# Patient Record
Sex: FEMALE | Race: WHITE | NOT HISPANIC OR LATINO | Employment: FULL TIME | URBAN - METROPOLITAN AREA
[De-identification: names, ages, dates, MRNs, and addresses within clinical notes are randomized per-mention and may not be internally consistent; named-entity substitution may affect disease eponyms.]

---

## 2017-02-10 ENCOUNTER — PATIENT MESSAGE (OUTPATIENT)
Dept: RHEUMATOLOGY | Facility: CLINIC | Age: 47
End: 2017-02-10

## 2017-02-10 ENCOUNTER — PATIENT MESSAGE (OUTPATIENT)
Dept: NEUROLOGY | Facility: CLINIC | Age: 47
End: 2017-02-10

## 2017-02-10 DIAGNOSIS — G40.319 GENERALIZED CONVULSIVE EPILEPSY WITH INTRACTABLE EPILEPSY: Primary | ICD-10-CM

## 2017-02-15 ENCOUNTER — TELEPHONE (OUTPATIENT)
Dept: RHEUMATOLOGY | Facility: CLINIC | Age: 47
End: 2017-02-15

## 2017-02-15 DIAGNOSIS — L40.50 PSORIATIC ARTHRITIS: ICD-10-CM

## 2017-02-15 RX ORDER — CELECOXIB 200 MG/1
200 CAPSULE ORAL DAILY PRN
Qty: 90 CAPSULE | Refills: 0 | Status: SHIPPED | OUTPATIENT
Start: 2017-02-15 | End: 2017-03-29 | Stop reason: SDUPTHER

## 2017-03-02 ENCOUNTER — PATIENT MESSAGE (OUTPATIENT)
Dept: NEUROLOGY | Facility: CLINIC | Age: 47
End: 2017-03-02

## 2017-03-03 ENCOUNTER — TELEPHONE (OUTPATIENT)
Dept: NEUROLOGY | Facility: CLINIC | Age: 47
End: 2017-03-03

## 2017-03-03 NOTE — TELEPHONE ENCOUNTER
Called in refill of Patient's Lamictal to [facility] in [place] per Patient request.She requests a 90 day supply.

## 2017-03-20 ENCOUNTER — LAB VISIT (OUTPATIENT)
Dept: LAB | Facility: HOSPITAL | Age: 47
End: 2017-03-20
Attending: INTERNAL MEDICINE
Payer: COMMERCIAL

## 2017-03-20 DIAGNOSIS — L40.9 PSORIASIS: ICD-10-CM

## 2017-03-20 DIAGNOSIS — L40.50 PSORIATIC ARTHRITIS: Chronic | ICD-10-CM

## 2017-03-20 DIAGNOSIS — G40.319 GENERALIZED CONVULSIVE EPILEPSY WITH INTRACTABLE EPILEPSY: ICD-10-CM

## 2017-03-20 DIAGNOSIS — Z79.899 ENCOUNTER FOR MONITORING LEFLUNOMIDE THERAPY: ICD-10-CM

## 2017-03-20 DIAGNOSIS — Z51.81 ENCOUNTER FOR MONITORING LEFLUNOMIDE THERAPY: ICD-10-CM

## 2017-03-20 DIAGNOSIS — Z91.89 AT RISK FOR OSTEOPOROSIS/OSTEOPENIA: ICD-10-CM

## 2017-03-20 LAB
25(OH)D3+25(OH)D2 SERPL-MCNC: 32 NG/ML
ALBUMIN SERPL BCP-MCNC: 4 G/DL
ALP SERPL-CCNC: 110 U/L
ALT SERPL W/O P-5'-P-CCNC: 29 U/L
ANION GAP SERPL CALC-SCNC: 9 MMOL/L
AST SERPL-CCNC: 21 U/L
BASOPHILS # BLD AUTO: 0.03 K/UL
BASOPHILS NFR BLD: 0.5 %
BILIRUB SERPL-MCNC: 0.5 MG/DL
BUN SERPL-MCNC: 17 MG/DL
CALCIUM SERPL-MCNC: 10.2 MG/DL
CHLORIDE SERPL-SCNC: 103 MMOL/L
CO2 SERPL-SCNC: 30 MMOL/L
CREAT SERPL-MCNC: 0.8 MG/DL
CRP SERPL-MCNC: 4.4 MG/L
DIFFERENTIAL METHOD: NORMAL
EOSINOPHIL # BLD AUTO: 0.3 K/UL
EOSINOPHIL NFR BLD: 5.9 %
ERYTHROCYTE [DISTWIDTH] IN BLOOD BY AUTOMATED COUNT: 13.9 %
ERYTHROCYTE [SEDIMENTATION RATE] IN BLOOD BY WESTERGREN METHOD: 7 MM/HR
EST. GFR  (AFRICAN AMERICAN): >60 ML/MIN/1.73 M^2
EST. GFR  (NON AFRICAN AMERICAN): >60 ML/MIN/1.73 M^2
GLUCOSE SERPL-MCNC: 102 MG/DL
HCT VFR BLD AUTO: 44.2 %
HGB BLD-MCNC: 14.2 G/DL
LYMPHOCYTES # BLD AUTO: 1.8 K/UL
LYMPHOCYTES NFR BLD: 32 %
MCH RBC QN AUTO: 28.7 PG
MCHC RBC AUTO-ENTMCNC: 32.1 %
MCV RBC AUTO: 89 FL
MONOCYTES # BLD AUTO: 0.4 K/UL
MONOCYTES NFR BLD: 7.3 %
NEUTROPHILS # BLD AUTO: 3 K/UL
NEUTROPHILS NFR BLD: 54.1 %
PLATELET # BLD AUTO: 286 K/UL
PMV BLD AUTO: 10.8 FL
POTASSIUM SERPL-SCNC: 4 MMOL/L
PROT SERPL-MCNC: 7.5 G/DL
RBC # BLD AUTO: 4.95 M/UL
SODIUM SERPL-SCNC: 142 MMOL/L
WBC # BLD AUTO: 5.59 K/UL

## 2017-03-20 PROCEDURE — 80053 COMPREHEN METABOLIC PANEL: CPT

## 2017-03-20 PROCEDURE — 85651 RBC SED RATE NONAUTOMATED: CPT | Mod: PO

## 2017-03-20 PROCEDURE — 82306 VITAMIN D 25 HYDROXY: CPT

## 2017-03-20 PROCEDURE — 80175 DRUG SCREEN QUAN LAMOTRIGINE: CPT

## 2017-03-20 PROCEDURE — 85025 COMPLETE CBC W/AUTO DIFF WBC: CPT

## 2017-03-20 PROCEDURE — 86140 C-REACTIVE PROTEIN: CPT

## 2017-03-23 LAB — LAMOTRIGINE SERPL-MCNC: 4.5 UG/ML (ref 2–15)

## 2017-03-29 ENCOUNTER — OFFICE VISIT (OUTPATIENT)
Dept: RHEUMATOLOGY | Facility: CLINIC | Age: 47
End: 2017-03-29
Payer: COMMERCIAL

## 2017-03-29 VITALS
BODY MASS INDEX: 30.88 KG/M2 | HEART RATE: 87 BPM | DIASTOLIC BLOOD PRESSURE: 103 MMHG | WEIGHT: 215.69 LBS | SYSTOLIC BLOOD PRESSURE: 156 MMHG | HEIGHT: 70 IN

## 2017-03-29 DIAGNOSIS — Z51.81 ENCOUNTER FOR MONITORING LEFLUNOMIDE THERAPY: ICD-10-CM

## 2017-03-29 DIAGNOSIS — L40.9 PSORIASIS: ICD-10-CM

## 2017-03-29 DIAGNOSIS — E55.9 VITAMIN D DEFICIENCY: ICD-10-CM

## 2017-03-29 DIAGNOSIS — Z79.899 ENCOUNTER FOR MONITORING LEFLUNOMIDE THERAPY: ICD-10-CM

## 2017-03-29 DIAGNOSIS — L60.1 ONYCHOLYSIS OF TOENAIL: ICD-10-CM

## 2017-03-29 DIAGNOSIS — L40.50 PSORIATIC ARTHRITIS: Primary | Chronic | ICD-10-CM

## 2017-03-29 DIAGNOSIS — I10 ESSENTIAL HYPERTENSION: ICD-10-CM

## 2017-03-29 PROCEDURE — 1160F RVW MEDS BY RX/DR IN RCRD: CPT | Mod: S$GLB,,, | Performed by: INTERNAL MEDICINE

## 2017-03-29 PROCEDURE — 99214 OFFICE O/P EST MOD 30 MIN: CPT | Mod: S$GLB,,, | Performed by: INTERNAL MEDICINE

## 2017-03-29 PROCEDURE — 99999 PR PBB SHADOW E&M-EST. PATIENT-LVL III: CPT | Mod: PBBFAC,,, | Performed by: INTERNAL MEDICINE

## 2017-03-29 PROCEDURE — 3080F DIAST BP >= 90 MM HG: CPT | Mod: S$GLB,,, | Performed by: INTERNAL MEDICINE

## 2017-03-29 PROCEDURE — 3077F SYST BP >= 140 MM HG: CPT | Mod: S$GLB,,, | Performed by: INTERNAL MEDICINE

## 2017-03-29 RX ORDER — LEFLUNOMIDE 20 MG/1
20 TABLET ORAL DAILY
Qty: 90 TABLET | Refills: 0 | Status: SHIPPED | OUTPATIENT
Start: 2017-03-29 | End: 2017-06-26 | Stop reason: SDUPTHER

## 2017-03-29 RX ORDER — ERGOCALCIFEROL 1.25 MG/1
50000 CAPSULE ORAL
Qty: 12 CAPSULE | Refills: 3 | Status: SHIPPED | OUTPATIENT
Start: 2017-03-29 | End: 2018-03-15 | Stop reason: SDUPTHER

## 2017-03-29 RX ORDER — CELECOXIB 200 MG/1
200 CAPSULE ORAL DAILY PRN
Qty: 90 CAPSULE | Refills: 1 | Status: SHIPPED | OUTPATIENT
Start: 2017-03-29 | End: 2017-09-22 | Stop reason: SDUPTHER

## 2017-03-29 ASSESSMENT — ROUTINE ASSESSMENT OF PATIENT INDEX DATA (RAPID3)
PATIENT GLOBAL ASSESSMENT SCORE: 0
AM STIFFNESS SCORE: 0, NO
TOTAL RAPID3 SCORE: 2.39
FATIGUE SCORE: 7
MDHAQ FUNCTION SCORE: .5
PSYCHOLOGICAL DISTRESS SCORE: 0
PAIN SCORE: 5.5

## 2017-03-29 ASSESSMENT — DISEASE ACTIVITY SCORE (DAS28)
GLOBAL_HEALTH_SCORE: 0
CRP_MG_PER_LITER: 4.4
ESR_MM_PER_HR: 7

## 2017-03-29 NOTE — MR AVS SNAPSHOT
[facility]  [address]  [facility] [address]  Phone: [phone]  Fax: [phone]                  Mable Arguello   3/29/2017 2:00 PM   Office Visit    Description:  Female : 1970   Provider:  Waqar Noel MD   Department:  [facility]           Diagnoses this Visit        Comments    Psoriatic arthritis    -  Primary     Encounter for monitoring leflunomide therapy         Psoriasis         Vitamin D deficiency         Onycholysis of toenail         Essential hypertension                To Do List           Goals (5 Years of Data)     None      Follow-Up and Disposition     Return in about 3 months (around 2017).       These Medications        Disp Refills Start End    leflunomide (ARAVA) 20 MG Tab 90 tablet 0 3/29/2017     Take 1 tablet (20 mg total) by mouth once daily. - Oral    Pharmacy: [facility]/pharmacy #[id number] [facility] [address] Ph #: [phone]       celecoxib (CELEBREX) 200 MG capsule 90 capsule 1 3/29/2017     Take 1 capsule (200 mg total) by mouth daily as needed. - Oral    Pharmacy: [facility]/pharmacy #[address] [place] [address] [address] Ph #: [phone]       ergocalciferol (VITAMIN D2) 50,000 unit Cap 12 capsule 3 3/29/2017     Take 1 capsule (50,000 Units total) by mouth every 7 days. - Oral    Pharmacy: [facility]/pharmacy #[address] [place] [address] [address] Ph #: [phone]         [facility] On Call     [facility] On Call Nurse Care Line -  Assistance  Registered nurses in the [facility] provide clinical advisement, health education, appointment booking, and other advisory services.  Call for this free service at [phone].             Medications           Message regarding Medications     Verify the changes and/or additions to your medication regime listed below are the same as discussed with your clinician today.  If any of these changes or additions are incorrect, please notify your  "healthcare provider.        STOP taking these medications     urea (X-VIATE) 40 % cream Apply 1 applicator topically 2 (two) times daily as needed.             Verify that the below list of medications is an accurate representation of the medications you are currently taking.  If none reported, the list may be blank. If incorrect, please contact your healthcare provider. Carry this list with you in case of emergency.           Current Medications     albuterol (PROVENTIL HFA) 90 mcg/actuation inhaler INHALE 2 PUFFS BY MOUTH FOUR TIMES DAILY AS NEEDED FOR WHEEZING    atorvastatin (LIPITOR) 10 MG tablet Take 1 tablet (10 mg total) by mouth once daily.    biotin 10,000 mcg Cap Take by mouth.    celecoxib (CELEBREX) 200 MG capsule Take 1 capsule (200 mg total) by mouth daily as needed.    clotrimazole-betamethasone 1-0.05% (LOTRISONE) cream Apply topically 2 (two) times daily.    ergocalciferol (VITAMIN D2) 50,000 unit Cap Take 1 capsule (50,000 Units total) by mouth every 7 days.    leflunomide (ARAVA) 20 MG Tab Take 1 tablet (20 mg total) by mouth once daily.    losartan-hydrochlorothiazide 100-12.5 mg (HYZAAR) 100-12.5 mg Tab Take 1 tablet by mouth once daily.    thiamine (VITAMIN B-1) 50 MG tablet Take 50 mg by mouth once daily.    lamotrigine (LAMICTAL) 200 MG tablet Take 2 tablets (400 mg total) by mouth once daily.           Clinical Reference Information           Your Vitals Were     BP Pulse Height Weight Last Period BMI    156/103 87 5' 9.6" (1.768 m) 97.8 kg (215 lb 11.2 oz) 07/08/2008 31.31 kg/m2      Blood Pressure          Most Recent Value    BP  (!)  156/103      Allergies as of 3/29/2017     Topamax [Topiramate]      Immunizations Administered on Date of Encounter - 3/29/2017     None      Orders Placed During Today's Visit     Future Labs/Procedures Expected by Expires    XR Arthritis Survey  3/29/2017 3/29/2018      Language Assistance Services     ATTENTION: Language assistance services are " available, free of charge. Please call [phone].      ATENCIÓN: Si habla vishalañol, tiene a jimenez disposición servicios gratuitos de asistencia lingüística. Llame al [phone].     CHÚ Ý: N?u b?n nói Ti?ng Vi?t, có các d?ch v? h? tr? ngôn ng? mi?n phí dành cho b?n. G?i s? [phone].         [facility] complies with applicable Federal civil rights laws and does not discriminate on the basis of race, color, national origin, age, disability, or sex.

## 2017-03-29 NOTE — PROGRESS NOTES
Subjective:       Patient ID: Mable Arguello is a 46 y.o. female.    Chief Complaint: Psoriatic arthritis    HPI Has cough, SOB starting Monday with cough, resumed inhaler which helped, no audible wheezing. Has appt with Dr. Montoya Friday. Also yesterday, pain left plantar heel, left great toe, left hip, knee and shoulder and wrist, now resolved.  No psoriasis skin lesions, still  Nail changes, unchanged. Also had some diffuse backache, now resolved. Has been taking Celebrex 200mg daily x 3 wks.  Review of Systems   Constitutional: Positive for fatigue. Negative for appetite change, fever and unexpected weight change.   HENT: Negative for mouth sores.         Mouth ulcers   Eyes: Negative for visual disturbance.   Respiratory: Positive for shortness of breath. Negative for cough and wheezing.    Cardiovascular: Negative for chest pain and palpitations.   Gastrointestinal: Negative for abdominal pain, anal bleeding, blood in stool, constipation, diarrhea, nausea and vomiting.   Genitourinary: Negative for dysuria, frequency and urgency.   Musculoskeletal: Negative for arthralgias, back pain, gait problem, joint swelling, myalgias, neck pain and neck stiffness.   Skin: Negative for rash.   Neurological: Negative for weakness, numbness and headaches.   Hematological: Negative for adenopathy. Does not bruise/bleed easily.   Psychiatric/Behavioral: Negative for sleep disturbance. The patient is not nervous/anxious.          Objective:   LMP 07/08/2008     Physical Exam   Constitutional: She is oriented to person, place, and time and well-developed, well-nourished, and in no distress.   HENT:   Head: Normocephalic and atraumatic.   Mouth/Throat: Oropharynx is clear and moist.   Eyes: Conjunctivae and EOM are normal.   Neck: Normal range of motion. Neck supple. No thyromegaly present.   Cardiovascular: Normal rate, regular rhythm, normal heart sounds and intact distal pulses.  Exam reveals no gallop and no friction  rub.    No murmur heard.  Pulmonary/Chest: Breath sounds normal. She has no wheezes. She has no rales. She exhibits no tenderness.   Abdominal: Soft. She exhibits no distension and no mass. There is no tenderness.       Right Side Rheumatological Exam     Examination finds the shoulder, elbow, wrist, knee, 1st PIP, 1st MCP, 2nd PIP, 2nd MCP, 3rd PIP, 3rd MCP, 4th PIP, 4th MCP, 5th PIP and 5th MCP normal.    Left Side Rheumatological Exam     Examination finds the shoulder, elbow, wrist, knee, 1st PIP, 1st MCP, 2nd PIP, 2nd MCP, 3rd PIP, 3rd MCP, 4th PIP, 4th MCP, 5th PIP and 5th MCP normal.      Lymphadenopathy:     She has no cervical adenopathy.   Neurological: She is alert and oriented to person, place, and time. She displays normal reflexes. Gait normal.   Nl motor strength UE and LE bilateral   Musculoskeletal: She exhibits no edema.       Results for ELIER SAVAGE (MRN [id number]) as of 3/29/2017 13:59   Ref. Range 3/20/2017 07:43   WBC Latest Ref Range: 3.90 - 12.70 K/uL 5.59   RBC Latest Ref Range: 4.00 - 5.40 M/uL 4.95   Hemoglobin Latest Ref Range: 12.0 - 16.0 g/dL 14.2   Hematocrit Latest Ref Range: 37.0 - 48.5 % 44.2   MCV Latest Ref Range: 82 - 98 fL 89   MCH Latest Ref Range: 27.0 - 31.0 pg 28.7   MCHC Latest Ref Range: 32.0 - 36.0 % 32.1   RDW Latest Ref Range: 11.5 - 14.5 % 13.9   Platelets Latest Ref Range: 150 - 350 K/uL 286   MPV Latest Ref Range: 9.2 - 12.9 fL 10.8   Gran% Latest Ref Range: 38.0 - 73.0 % 54.1   Gran # Latest Ref Range: 1.8 - 7.7 K/uL 3.0   Lymph% Latest Ref Range: 18.0 - 48.0 % 32.0   Lymph # Latest Ref Range: 1.0 - 4.8 K/uL 1.8   Mono% Latest Ref Range: 4.0 - 15.0 % 7.3   Mono # Latest Ref Range: 0.3 - 1.0 K/uL 0.4   Eosinophil% Latest Ref Range: 0.0 - 8.0 % 5.9   Eos # Latest Ref Range: 0.0 - 0.5 K/uL 0.3   Basophil% Latest Ref Range: 0.0 - 1.9 % 0.5   Baso # Latest Ref Range: 0.00 - 0.20 K/uL 0.03   Sed Rate Latest Ref Range: 0 - 20 mm/Hr 7   Sodium Latest Ref Range: 136 -  145 mmol/L 142   Potassium Latest Ref Range: 3.5 - 5.1 mmol/L 4.0   Chloride Latest Ref Range: 95 - 110 mmol/L 103   CO2 Latest Ref Range: 23 - 29 mmol/L 30 (H)   Anion Gap Latest Ref Range: 8 - 16 mmol/L 9   BUN, Bld Latest Ref Range: 6 - 20 mg/dL 17   Creatinine Latest Ref Range: 0.5 - 1.4 mg/dL 0.8   eGFR if non African American Latest Ref Range: >60 mL/min/1.73 m^2 >60.0   eGFR if African American Latest Ref Range: >60 mL/min/1.73 m^2 >60.0   Glucose Latest Ref Range: 70 - 110 mg/dL 102   Calcium Latest Ref Range: 8.7 - 10.5 mg/dL 10.2   Alkaline Phosphatase Latest Ref Range: 55 - 135 U/L 110   Total Protein Latest Ref Range: 6.0 - 8.4 g/dL 7.5   Albumin Latest Ref Range: 3.5 - 5.2 g/dL 4.0   Total Bilirubin Latest Ref Range: 0.1 - 1.0 mg/dL 0.5   AST Latest Ref Range: 10 - 40 U/L 21   ALT Latest Ref Range: 10 - 44 U/L 29   CRP Latest Ref Range: 0.0 - 8.2 mg/L 4.4   Vit D, 25-Hydroxy Latest Ref Range: 30 - 96 ng/mL 32   Lamotrigine Lvl Latest Ref Range: 2.0 - 15.0 ug/mL 4.5   Differential Method Unknown Automated        : 1970 ORDERING PHYSICIAN: NY Mauro LOCATION: [facility] [place]    HISTORY: 41 y/o female with a hx of a fractured left foot at 27 y/o and 33 y/o. She had a hysterectomy at 36 y/o and a Oophorectomy at 42 y/o. She exercise 3 times a week and does not smoke.    TECHNIQUE: Bone Mineral Density performed using [employer] [place] (S/N [id number]) reveals good positioning of lumbar spine and hip.    BONE MINERAL DENSITY RESULTS:  Lumbar Spine: Lumbar bone mineral density L1-L4 is 1.130g/cm2, which is a t-score of 0.8. The z-score is 1.2.    Total Hip: The total hip bone mineral density is 1.042g/cm2.  The t-score is 0.8, and the z-score is 1.1.  Femoral neck BMD is 0.906g/cm2 and the t-score is 0.5.    COMPARISONS:  Date Location BMD T-score  08 L-spine 1.271 0.2  Total Hip 0.991 0.4   Impression     Normal spine and hip BMD.     Recommendations:  1) Adequate calcium and Vitamin D therapy  2)  Appropriate exercise  3) No need to repeat BMD in near future unless clinical change      EXPLANATION OF RESULTS:  The t-score compares this results to the bone density of a 25 year old of the same gender. The z-score compares this result to the average bone density to people of the same age and gender. The amounts indicate the number of standard deviations above or   below the mean.  * Osteoporosis is generally defined as having a t-score between less      The x-rays are normal except for old healed fracture left 5th metatarsal, and osteoarthritis of great toes. No psoriatic arthritis damage. RJQ   External Result Report   External Result Report   Narrative   Technique: Arthritis.    Findings: Flexion view the cervical spine shows the odontoid and anterior mass of C1 have a normal relationship.  The cervical spine is intact.  AP view of the hand shows no bony erosions or destruction.  AP view of the feet shows an old healed fracture of the left fifth metatarsal otherwise feet are normal.  Standing view of the knee shows the joint space to be maintained.   Impression    No significant abnormality.      Electronically signed by: Aj Camara MD  Date: 12/09/15  Time: 11:24     Encounter   View Encounter        Results for ELIER SAVAGE (MRN [id number]) as of 3/29/2017 13:59   Ref. Range 11/11/2016 07:50   Cholesterol Latest Ref Range: 120 - 199 mg/dL 196   HDL Latest Ref Range: 40 - 75 mg/dL 75   LDL Cholesterol Latest Ref Range: 63.0 - 159.0 mg/dL 102.4   Total Cholesterol/HDL Ratio Latest Ref Range: 2.0 - 5.0  2.6   Triglycerides Latest Ref Range: 30 - 150 mg/dL 93     Assessment:         PsA TJC=0 SJC=0 global 10 ESR 8CRP 5.5 DAS28:1.5ARP77-JLS 1.77 (both remission) DAPSA TJ=0 + SJ=0 + PGA =1 + PtPain 0 + crp 0.55= 1.55(remission)  Psoriasis minimal   Onycholysis/onychomycosis all toenails, severe; fingernails normal  Vitamin D insufficiency, replete on vitamin D 50,000 units once a week  Hypertension has  been on losartan 100- HCT 12.5 daily x 3wks hypertension today, just used inhaler,  And has been on Celebrex daily x 3 wks.  Hyperlipidemia, now on atorvastatin 10mg daily         Plan:     arthritis survey  Cont leflunomide 20mg daily  Avoid Celebrex with hypertension-hold until after BP evaluation by internist  Cont vitamin D 50,000 units once A week  Add vitamin D to Dr. Mauro's labs  Standing labs q 3 months  Home BP monitor  RTC 6 months/prn flare

## 2017-03-30 ENCOUNTER — TELEPHONE (OUTPATIENT)
Dept: RHEUMATOLOGY | Facility: CLINIC | Age: 47
End: 2017-03-30

## 2017-03-30 ENCOUNTER — PATIENT MESSAGE (OUTPATIENT)
Dept: RHEUMATOLOGY | Facility: CLINIC | Age: 47
End: 2017-03-30

## 2017-04-27 ENCOUNTER — TELEPHONE (OUTPATIENT)
Dept: RHEUMATOLOGY | Facility: CLINIC | Age: 47
End: 2017-04-27

## 2017-06-26 ENCOUNTER — TELEPHONE (OUTPATIENT)
Dept: RHEUMATOLOGY | Facility: CLINIC | Age: 47
End: 2017-06-26

## 2017-06-26 RX ORDER — LEFLUNOMIDE 20 MG/1
20 TABLET ORAL DAILY
Qty: 30 TABLET | Refills: 0 | Status: SHIPPED | OUTPATIENT
Start: 2017-06-26 | End: 2017-12-10 | Stop reason: SDUPTHER

## 2017-06-29 ENCOUNTER — LAB VISIT (OUTPATIENT)
Dept: LAB | Facility: HOSPITAL | Age: 47
End: 2017-06-29
Attending: PHYSICAL MEDICINE & REHABILITATION
Payer: COMMERCIAL

## 2017-06-29 DIAGNOSIS — L40.50 PSORIATIC ARTHRITIS: Chronic | ICD-10-CM

## 2017-06-29 DIAGNOSIS — Z51.81 ENCOUNTER FOR MONITORING LEFLUNOMIDE THERAPY: ICD-10-CM

## 2017-06-29 DIAGNOSIS — Z91.89 AT RISK FOR OSTEOPOROSIS/OSTEOPENIA: ICD-10-CM

## 2017-06-29 DIAGNOSIS — Z79.899 ENCOUNTER FOR MONITORING LEFLUNOMIDE THERAPY: ICD-10-CM

## 2017-06-29 DIAGNOSIS — L40.9 PSORIASIS: ICD-10-CM

## 2017-06-29 LAB
ALBUMIN SERPL BCP-MCNC: 3.8 G/DL
ALP SERPL-CCNC: 116 U/L
ALT SERPL W/O P-5'-P-CCNC: 21 U/L
ANION GAP SERPL CALC-SCNC: 9 MMOL/L
AST SERPL-CCNC: 18 U/L
BASOPHILS # BLD AUTO: 0.04 K/UL
BASOPHILS NFR BLD: 0.7 %
BILIRUB SERPL-MCNC: 0.4 MG/DL
BUN SERPL-MCNC: 14 MG/DL
CALCIUM SERPL-MCNC: 9.6 MG/DL
CHLORIDE SERPL-SCNC: 106 MMOL/L
CO2 SERPL-SCNC: 26 MMOL/L
CREAT SERPL-MCNC: 0.8 MG/DL
CRP SERPL-MCNC: 6.7 MG/L
DIFFERENTIAL METHOD: NORMAL
EOSINOPHIL # BLD AUTO: 0.3 K/UL
EOSINOPHIL NFR BLD: 5.2 %
ERYTHROCYTE [DISTWIDTH] IN BLOOD BY AUTOMATED COUNT: 13.4 %
ERYTHROCYTE [SEDIMENTATION RATE] IN BLOOD BY WESTERGREN METHOD: 8 MM/HR
EST. GFR  (AFRICAN AMERICAN): >60 ML/MIN/1.73 M^2
EST. GFR  (NON AFRICAN AMERICAN): >60 ML/MIN/1.73 M^2
GLUCOSE SERPL-MCNC: 100 MG/DL
HCT VFR BLD AUTO: 41.8 %
HGB BLD-MCNC: 13.7 G/DL
LYMPHOCYTES # BLD AUTO: 1.9 K/UL
LYMPHOCYTES NFR BLD: 34.2 %
MCH RBC QN AUTO: 28.7 PG
MCHC RBC AUTO-ENTMCNC: 32.8 %
MCV RBC AUTO: 88 FL
MONOCYTES # BLD AUTO: 0.3 K/UL
MONOCYTES NFR BLD: 5.9 %
NEUTROPHILS # BLD AUTO: 2.9 K/UL
NEUTROPHILS NFR BLD: 53.8 %
PLATELET # BLD AUTO: 306 K/UL
PMV BLD AUTO: 10.5 FL
POTASSIUM SERPL-SCNC: 4.1 MMOL/L
PROT SERPL-MCNC: 7.1 G/DL
RBC # BLD AUTO: 4.77 M/UL
SODIUM SERPL-SCNC: 141 MMOL/L
WBC # BLD AUTO: 5.41 K/UL

## 2017-06-29 PROCEDURE — 85651 RBC SED RATE NONAUTOMATED: CPT | Mod: PO

## 2017-06-29 PROCEDURE — 85025 COMPLETE CBC W/AUTO DIFF WBC: CPT

## 2017-06-29 PROCEDURE — 36415 COLL VENOUS BLD VENIPUNCTURE: CPT | Mod: PO

## 2017-06-29 PROCEDURE — 86140 C-REACTIVE PROTEIN: CPT

## 2017-06-29 PROCEDURE — 80053 COMPREHEN METABOLIC PANEL: CPT

## 2017-08-04 ENCOUNTER — OFFICE VISIT (OUTPATIENT)
Dept: RHEUMATOLOGY | Facility: CLINIC | Age: 47
End: 2017-08-04
Payer: COMMERCIAL

## 2017-08-04 VITALS
DIASTOLIC BLOOD PRESSURE: 93 MMHG | BODY MASS INDEX: 31.35 KG/M2 | WEIGHT: 219 LBS | HEIGHT: 70 IN | HEART RATE: 92 BPM | SYSTOLIC BLOOD PRESSURE: 158 MMHG

## 2017-08-04 DIAGNOSIS — E78.5 HYPERLIPIDEMIA, UNSPECIFIED HYPERLIPIDEMIA TYPE: ICD-10-CM

## 2017-08-04 DIAGNOSIS — L40.50 PSORIATIC ARTHRITIS: Chronic | ICD-10-CM

## 2017-08-04 DIAGNOSIS — L60.1 ONYCHOLYSIS OF TOENAIL: ICD-10-CM

## 2017-08-04 DIAGNOSIS — Z51.81 ENCOUNTER FOR MONITORING LEFLUNOMIDE THERAPY: Primary | ICD-10-CM

## 2017-08-04 DIAGNOSIS — T63.621A JELLYFISH STING, ACCIDENTAL OR UNINTENTIONAL, INITIAL ENCOUNTER: ICD-10-CM

## 2017-08-04 DIAGNOSIS — L40.9 PSORIASIS: ICD-10-CM

## 2017-08-04 DIAGNOSIS — I10 ESSENTIAL HYPERTENSION: ICD-10-CM

## 2017-08-04 DIAGNOSIS — E55.9 VITAMIN D DEFICIENCY: ICD-10-CM

## 2017-08-04 DIAGNOSIS — Z79.899 ENCOUNTER FOR MONITORING LEFLUNOMIDE THERAPY: Primary | ICD-10-CM

## 2017-08-04 PROCEDURE — 3008F BODY MASS INDEX DOCD: CPT | Mod: S$GLB,,, | Performed by: INTERNAL MEDICINE

## 2017-08-04 PROCEDURE — 99213 OFFICE O/P EST LOW 20 MIN: CPT | Mod: S$GLB,,, | Performed by: INTERNAL MEDICINE

## 2017-08-04 PROCEDURE — 99999 PR PBB SHADOW E&M-EST. PATIENT-LVL III: CPT | Mod: PBBFAC,,, | Performed by: INTERNAL MEDICINE

## 2017-08-04 ASSESSMENT — DISEASE ACTIVITY SCORE (DAS28)
TOTAL_SCORE_CRP: 1.83
GLOBAL_HEALTH_SCORE: 10
TENDER_JOINTS_COUNT: 0
CRP_MG_PER_LITER: 6.7
ESR_MM_PER_HR: 8
SWOLLEN_JOINTS_COUNT: 0
TOTAL_SCORE_ESR: 1.6

## 2017-08-04 NOTE — PROGRESS NOTES
"Subjective:       Patient ID: Mable Arguello is a 46 y.o. female.    Chief Complaint: No chief complaint on file.    HPI Feels fine. No joint pain or swelling. Only psoriasis guttate dorsal foot. Stung by jellyfish 2 weeks ago, used triamcinolone x 1 application, no help; then applied hydrocortisone x 2 helped. Has not been taking Hyzaar consistently.      Review of Systems   Constitutional: Negative for appetite change, fatigue, fever and unexpected weight change.   HENT: Negative for mouth sores and trouble swallowing.    Eyes: Negative for redness and visual disturbance.   Respiratory: Negative for cough, shortness of breath and wheezing.    Cardiovascular: Negative for chest pain and palpitations.   Gastrointestinal: Negative for abdominal pain, anal bleeding, blood in stool, constipation, diarrhea, nausea and vomiting.   Genitourinary: Negative for dysuria, frequency, genital sores and urgency.   Musculoskeletal: Negative for arthralgias, back pain, gait problem, joint swelling, myalgias, neck pain and neck stiffness.   Skin: Positive for rash (jellyfish sting left distal medial upper arm and proximal medial forearm).   Neurological: Negative for weakness, numbness and headaches.   Hematological: Negative for adenopathy. Does not bruise/bleed easily.   Psychiatric/Behavioral: Negative for sleep disturbance. The patient is not nervous/anxious.          Objective:   BP (!) 158/93   Pulse 92   Ht 5' 9.6" (1.768 m)   Wt 99.3 kg (219 lb)   LMP 07/08/2008   BMI 31.79 kg/m²      Physical Exam   Constitutional: She is oriented to person, place, and time and well-developed, well-nourished, and in no distress.   HENT:   Head: Normocephalic and atraumatic.   Mouth/Throat: Oropharynx is clear and moist.   Eyes: Conjunctivae and EOM are normal.   Neck: Normal range of motion. Neck supple. No thyromegaly present.   Cardiovascular: Normal rate, regular rhythm, normal heart sounds and intact distal pulses.  Exam " reveals no gallop and no friction rub.    No murmur heard.  Pulmonary/Chest: Breath sounds normal. She has no wheezes. She has no rales. She exhibits no tenderness.   Abdominal: Soft. She exhibits no distension and no mass. There is no tenderness.       Right Side Rheumatological Exam     Examination finds the shoulder, elbow, wrist, knee, 1st PIP, 1st MCP, 2nd PIP, 2nd MCP, 3rd PIP, 3rd MCP, 4th PIP, 4th MCP, 5th PIP and 5th MCP normal.    Left Side Rheumatological Exam     Examination finds the shoulder, elbow, wrist, knee, 1st PIP, 1st MCP, 2nd PIP, 2nd MCP, 3rd PIP, 3rd MCP, 4th PIP, 4th MCP, 5th PIP and 5th MCP normal.      Lymphadenopathy:     She has no cervical adenopathy.   Neurological: She is alert and oriented to person, place, and time. She displays normal reflexes. Gait normal.   Nl motor strength UE and LE bilateral   Skin:     Plantar psoriasis bilateral  onchycomycosis all toes  Guttate psoriasis dorsal foot   Musculoskeletal: She exhibits no edema.       Results for ELIER SAVAGE (MRN [id number]) as of 8/4/2017 15:24   Ref. Range 6/29/2017 07:14   WBC Latest Ref Range: 3.90 - 12.70 K/uL 5.41   RBC Latest Ref Range: 4.00 - 5.40 M/uL 4.77   Hemoglobin Latest Ref Range: 12.0 - 16.0 g/dL 13.7   Hematocrit Latest Ref Range: 37.0 - 48.5 % 41.8   MCV Latest Ref Range: 82 - 98 fL 88   MCH Latest Ref Range: 27.0 - 31.0 pg 28.7   MCHC Latest Ref Range: 32.0 - 36.0 % 32.8   RDW Latest Ref Range: 11.5 - 14.5 % 13.4   Platelets Latest Ref Range: 150 - 350 K/uL 306   MPV Latest Ref Range: 9.2 - 12.9 fL 10.5   Gran% Latest Ref Range: 38.0 - 73.0 % 53.8   Gran # Latest Ref Range: 1.8 - 7.7 K/uL 2.9   Lymph% Latest Ref Range: 18.0 - 48.0 % 34.2   Lymph # Latest Ref Range: 1.0 - 4.8 K/uL 1.9   Mono% Latest Ref Range: 4.0 - 15.0 % 5.9   Mono # Latest Ref Range: 0.3 - 1.0 K/uL 0.3   Eosinophil% Latest Ref Range: 0.0 - 8.0 % 5.2   Eos # Latest Ref Range: 0.0 - 0.5 K/uL 0.3   Basophil% Latest Ref Range: 0.0 - 1.9 %  0.7   Baso # Latest Ref Range: 0.00 - 0.20 K/uL 0.04   Sed Rate Latest Ref Range: 0 - 20 mm/Hr 8   Sodium Latest Ref Range: 136 - 145 mmol/L 141   Potassium Latest Ref Range: 3.5 - 5.1 mmol/L 4.1   Chloride Latest Ref Range: 95 - 110 mmol/L 106   CO2 Latest Ref Range: 23 - 29 mmol/L 26   Anion Gap Latest Ref Range: 8 - 16 mmol/L 9   BUN, Bld Latest Ref Range: 6 - 20 mg/dL 14   Creatinine Latest Ref Range: 0.5 - 1.4 mg/dL 0.8   eGFR if non African American Latest Ref Range: >60 mL/min/1.73 m^2 >60.0   eGFR if African American Latest Ref Range: >60 mL/min/1.73 m^2 >60.0   Glucose Latest Ref Range: 70 - 110 mg/dL 100   Calcium Latest Ref Range: 8.7 - 10.5 mg/dL 9.6   Alkaline Phosphatase Latest Ref Range: 55 - 135 U/L 116   Total Protein Latest Ref Range: 6.0 - 8.4 g/dL 7.1   Albumin Latest Ref Range: 3.5 - 5.2 g/dL 3.8   Total Bilirubin Latest Ref Range: 0.1 - 1.0 mg/dL 0.4   AST Latest Ref Range: 10 - 40 U/L 18   ALT Latest Ref Range: 10 - 44 U/L 21   CRP Latest Ref Range: 0.0 - 8.2 mg/L 6.7      Results for ELIER SAVAGE (MRN [id number]) as of 8/4/2017 15:24   Ref. Range 11/11/2016 07:50   Cholesterol Latest Ref Range: 120 - 199 mg/dL 196   HDL Latest Ref Range: 40 - 75 mg/dL 75   LDL Cholesterol Latest Ref Range: 63.0 - 159.0 mg/dL 102.4   Total Cholesterol/HDL Ratio Latest Ref Range: 2.0 - 5.0  2.6   Triglycerides Latest Ref Range: 30 - 150 mg/dL 93   Results for ELIER SAVAGE (MRN [id number]) as of 8/4/2017 15:24   Ref. Range 3/20/2017 07:43   Vit D, 25-Hydroxy Latest Ref Range: 30 - 96 ng/mL 32     Assessment:     Jellyfish sting, persistent  PsA TJC=0 SJC=0 global 10 ESR 8 CRP 5.5 DAS28:1.6 HIB65-XWU 1.83 (both remission) DAPSA TJ=0 + SJ=0 + PGA =1 + PtPain 1 + crp 0.67= 2.67(remission)  Psoriasis minimal   Onycholysis/onychomycosis all toenails, severe; fingernails normal  Vitamin D insufficiency, replete on vitamin D 50,000 units once a week  Hypertension not taking losartan 100- HCT 12.5 consistently,    Hyperlipidemia, now on atorvastatin 10mg daily       Plan:     triamcinolone cream twice daily x 1 wks left arm, see Dr. Montoya if doesn't resolve  Take Hyzaar! As prescribed  arthritis survey  Cont leflunomide 20mg daily  Avoid Celebrex with hypertension-hold until after BP evaluation by internist  Cont vitamin D 50,000 units once A week  Standing labs q 3 months  Home BP monitor  RTC 6 months/prn flare

## 2017-09-22 DIAGNOSIS — L40.50 PSORIATIC ARTHRITIS: Chronic | ICD-10-CM

## 2017-09-22 RX ORDER — CELECOXIB 200 MG/1
200 CAPSULE ORAL DAILY PRN
Qty: 90 CAPSULE | Refills: 0 | Status: SHIPPED | OUTPATIENT
Start: 2017-09-22 | End: 2018-03-15 | Stop reason: SDUPTHER

## 2017-10-10 ENCOUNTER — PATIENT MESSAGE (OUTPATIENT)
Dept: NEUROLOGY | Facility: CLINIC | Age: 47
End: 2017-10-10

## 2017-10-18 ENCOUNTER — LAB VISIT (OUTPATIENT)
Dept: LAB | Facility: HOSPITAL | Age: 47
End: 2017-10-18
Attending: INTERNAL MEDICINE
Payer: COMMERCIAL

## 2017-10-18 DIAGNOSIS — E78.5 HYPERLIPIDEMIA, UNSPECIFIED HYPERLIPIDEMIA TYPE: ICD-10-CM

## 2017-10-18 DIAGNOSIS — R00.2 PALPITATION: ICD-10-CM

## 2017-10-18 LAB
CHOLEST SERPL-MCNC: 249 MG/DL
CHOLEST/HDLC SERPL: 3.4 {RATIO}
HDLC SERPL-MCNC: 73 MG/DL
HDLC SERPL: 29.3 %
LDLC SERPL CALC-MCNC: 157.6 MG/DL
NONHDLC SERPL-MCNC: 176 MG/DL
TRIGL SERPL-MCNC: 92 MG/DL
TSH SERPL DL<=0.005 MIU/L-ACNC: 1.32 UIU/ML

## 2017-10-18 PROCEDURE — 84443 ASSAY THYROID STIM HORMONE: CPT

## 2017-10-18 PROCEDURE — 80061 LIPID PANEL: CPT

## 2017-10-18 PROCEDURE — 36415 COLL VENOUS BLD VENIPUNCTURE: CPT | Mod: PO

## 2017-10-19 ENCOUNTER — PATIENT MESSAGE (OUTPATIENT)
Dept: OBSTETRICS AND GYNECOLOGY | Facility: CLINIC | Age: 47
End: 2017-10-19

## 2017-10-31 ENCOUNTER — PATIENT MESSAGE (OUTPATIENT)
Dept: OBSTETRICS AND GYNECOLOGY | Facility: CLINIC | Age: 47
End: 2017-10-31

## 2017-10-31 ENCOUNTER — HOSPITAL ENCOUNTER (OUTPATIENT)
Dept: RADIOLOGY | Facility: HOSPITAL | Age: 47
Discharge: HOME | End: 2017-10-31
Attending: SPECIALIST
Payer: COMMERCIAL

## 2017-10-31 ENCOUNTER — OFFICE VISIT (OUTPATIENT)
Dept: OBSTETRICS AND GYNECOLOGY | Facility: CLINIC | Age: 47
End: 2017-10-31
Payer: COMMERCIAL

## 2017-10-31 VITALS
BODY MASS INDEX: 31.35 KG/M2 | HEIGHT: 70 IN | BODY MASS INDEX: 31.43 KG/M2 | WEIGHT: 219.56 LBS | WEIGHT: 219 LBS | HEIGHT: 70 IN

## 2017-10-31 DIAGNOSIS — N39.46 MIXED STRESS AND URGE URINARY INCONTINENCE: ICD-10-CM

## 2017-10-31 DIAGNOSIS — Z12.31 VISIT FOR SCREENING MAMMOGRAM: ICD-10-CM

## 2017-10-31 DIAGNOSIS — Z01.419 GYNECOLOGIC EXAM NORMAL: Primary | ICD-10-CM

## 2017-10-31 PROCEDURE — 77067 SCR MAMMO BI INCL CAD: CPT | Mod: 26,,, | Performed by: RADIOLOGY

## 2017-10-31 PROCEDURE — 77063 BREAST TOMOSYNTHESIS BI: CPT | Mod: 26,,, | Performed by: RADIOLOGY

## 2017-10-31 PROCEDURE — 87624 HPV HI-RISK TYP POOLED RSLT: CPT

## 2017-10-31 PROCEDURE — 77067 SCR MAMMO BI INCL CAD: CPT | Mod: TC

## 2017-10-31 PROCEDURE — 99396 PREV VISIT EST AGE 40-64: CPT | Mod: S$GLB,,, | Performed by: SPECIALIST

## 2017-10-31 PROCEDURE — 99999 PR PBB SHADOW E&M-EST. PATIENT-LVL III: CPT | Mod: PBBFAC,,, | Performed by: SPECIALIST

## 2017-10-31 PROCEDURE — 88175 CYTOPATH C/V AUTO FLUID REDO: CPT

## 2017-11-01 ENCOUNTER — OFFICE VISIT (OUTPATIENT)
Dept: NEUROLOGY | Facility: CLINIC | Age: 47
End: 2017-11-01
Payer: COMMERCIAL

## 2017-11-01 VITALS
HEART RATE: 75 BPM | HEIGHT: 69 IN | WEIGHT: 219.81 LBS | BODY MASS INDEX: 32.56 KG/M2 | SYSTOLIC BLOOD PRESSURE: 151 MMHG | DIASTOLIC BLOOD PRESSURE: 100 MMHG

## 2017-11-01 DIAGNOSIS — G43.109 MIGRAINE WITH AURA AND WITHOUT STATUS MIGRAINOSUS, NOT INTRACTABLE: ICD-10-CM

## 2017-11-01 DIAGNOSIS — E53.8 B12 DEFICIENCY: ICD-10-CM

## 2017-11-01 DIAGNOSIS — E55.9 VITAMIN D DEFICIENCY: ICD-10-CM

## 2017-11-01 DIAGNOSIS — G40.319 GENERALIZED CONVULSIVE EPILEPSY WITH INTRACTABLE EPILEPSY: Primary | ICD-10-CM

## 2017-11-01 PROCEDURE — 99999 PR PBB SHADOW E&M-EST. PATIENT-LVL III: CPT | Mod: PBBFAC,,, | Performed by: PSYCHIATRY & NEUROLOGY

## 2017-11-01 PROCEDURE — 99214 OFFICE O/P EST MOD 30 MIN: CPT | Mod: S$GLB,,, | Performed by: PSYCHIATRY & NEUROLOGY

## 2017-11-01 RX ORDER — LAMOTRIGINE 200 MG/1
400 TABLET ORAL DAILY
Qty: 180 TABLET | Refills: 3 | Status: SHIPPED | OUTPATIENT
Start: 2017-11-01 | End: 2018-11-20 | Stop reason: SDUPTHER

## 2017-11-01 NOTE — PROGRESS NOTES
Name: Elier Arguello  MRN: [id number]   CSN: [id number]      Date: 11/01/2017    HISTORY OF PRESENT ILLNESS (HPI)    Interval Histories  2017/11/01    The patient remains on 400 mg of Lamictal daily.  Levels have been stable for the past 3 yrs.  She continues to take B-complex as she had been deficient in B12.  Last sz was in 2004.  Topiramate was changed to Lamictal due to paresthesia in her limbs.      Results for ELIER ARGUELLO (MRN [id number]) as of 11/1/2017 13:14   Ref. Range 3/11/2015 14:05 12/19/2015 09:15 10/4/2016 07:31 3/20/2017 07:43   Lamotrigine Lvl Latest Ref Range: 2.0 - 15.0 ug/mL 7.8 3.8 5.1 4.5   Results for ELIER ARGUELLO (MRN [id number]) as of 11/1/2017 13:14   Ref. Range 6/12/2013 15:30 9/25/2014 11:22   Vitamin B-12 Latest Ref Range: 210 - 950 pg/mL 280 376     2016/03/9   Pt arrives to clinic alone today.  She says she has remained seizure free for 11 years.  Previously she had remained seizure free for 14 years though she had an event during her last pregnancy at that time.  Says that her migraines finally have stopped.  Says that she had her last one in March.  Says they used to happen monthly, even after a hysterectomy.  However, now she says that she has not had an event in months.  She expresses a hope that maybe she has gone through menopause, which could bear responsibility for this positive change in her symptoms.    Says her last psoriatic arthritis flare occurred between January and march of this year, with her symptoms of chronic fatigue worsening concomitantly.  Her symptoms are much improved now.     She remains on lamictal 400mg PO daily.  Denies side effects presently.  She takes it daily with her cup of coffee at her desk every morning.  She takes it with her coffee at breakfast over the weekends.  When she forgets to take her meds, she simply takes a double dose.     Her symptoms of numbness and tingling of the fingers have resolved since stopping topamax. Denies any  requests for changes, or new needs in clinic today.      2014/09/24   Last visit the Topiramate was stopped and Lamotrigine was started.  The tingling in fingers/toes stopped.  Her hair stated to fall out and the Derm she saw thought it was a combination of Vera and Lamictal.  He started her on Rogane which she uses daily.     She had a colonoscopy and was found by the GI to be fructose intolerant.  She avoids fruits that contain fructose and she has started to feel better.  She is no longer easily fatigued which started in 2008.  She had GI symptoms of irritable bowel, diarrhea, constipation and incontinence which has improved with the change in diet.  Leg cramps which occur mainly at night have dramatically improved.  She use to need to use an inhaler for SOB but with the diet changes has not needed to continue using it.     She continues to have once a month symptom complex of tingling of her scalp, visual change (Tunnel vision), feeling a weight is over her, with change in mood, depression, nausea and vomiting.  Headache is a minor part of the complex.  These will last only a couple of minutes.  She has experienced one occasion of post coital exploding headache.  She is taking B-vit supplements and Mg.      Epilepsy History   The patient is a 46 y.o. yo Right handed  female referred for consultation by Dr. Nathan Sam.  The patient was unaccompanied and she provided all of the history.  Seizures started at 11 years of age.  She had convulsions that have been controlled with Tegretol XR.  Last seizure was in 2004 and was 3 weeks after delivery of her daughter and before that the previous seizure was at age 21 (1992).   Aura consisted of tingling down the left side of her body into her toes, tunnel vision, and nausea.  She had a history of severe headaches which occur after a convulsion.  She has a a strong family history of migraine in her maternal aunt and her daughter.     She reports that she use to  "have "dizzy spells" which had occurred around her menses.  She has had her ovaries removed in Mar 2-12.  The initial symptoms were dizziness with associated nausea and subsequent vomiting.  She becomes confused and blank out.  I reviewed the records from Dr Sam and she had EEGs done years ago that did show right temporal sharp waves.  Although she has been seizure free for years the presence of an epileptic focus dictates continued treatment.      Seizure Seminology  Seizure Type 1  Classification:   Aura - visual perceptual change  Ictus  - Nonconv -  - Conv - generalized jerking with tongue biting  - Duration -   Post-ictal  - Symptoms- fatigue.  - Duration- two days.  Seizure Frequency - last one GTC after her daughter was borne 2004.  Age of onset - 11y age    Seizure Triggers  Sleep Deprivation - None  Other medicaitons - None  Psych/stress - None  Photic stimulation - None  Hyperventilation - None  Medical Problems - None  Menses - No  Sensory Stimulation (light, sound, etc)     AED Treatments  Present regimen  lamotrigine (Lamictal, LTG)     Results for ELIER SAVAGE (MRN [id number]) as of 1/8/2014 13:58   Ref. Range 6/12/2013 15:30   Thiamine Latest Range:  ug/L 53   Vitamin B2 Latest Range: 1-19 mcg/L 4   Vitamin B6 Latest Range: 5-50 ug/L 7   Vit D, 25-Hydroxy Latest Range: 30-96 ng/mL 18 (L)       Prior treatments  carbamazepine (Tegretol, CBZ) -  mg 3 BID- she can take only brand Tegretol XR.      Not tried  acetazolamide (Diamox, AZM)  ethosuximide (Zarontin, ESM)  gabapentin (Neurontin, GPN)  lacosamide (Vimpat, LCS)   lamotrigine (Lamictal, LTG)   levetiracetam (Keppra, LEV)  methsuximide (Celontin, MSM)  methyphenytoin (Mesantion, MHT)  oxcarbazepine (Trileptal OXC)  phenobarbital (Pb)  phenytoin (Dilantin, PHT)  pregabalin (Lyrica, PGB)   primidone (Mysoline, PRM)  retigabine (Potiga, RTG)  rufinamide (Banzel, RUF)  tiagabine (Gabatril,  TGB)  topiramate (Topamax, TPM)  viagabatrin, " "(Sabril, VGB)  vagal nerve stimulator (VNS)  valproic acid (Depakote, VPA)  zonisamide (Zonegran, ZNA)  Benzodiazepines  diazepam - rectal (Diastatl)  diazepam - oral (Valium, DZ)  clonazepam (Klonopin, CZP)  clorazepate (Tranxene, CLZ)  clobezam (Frizium, CLB)  Ativan  Brain Stimulation  Vagal Nerve Stimulato  DBS    Compliance method  Memory - yes  Pill Box - no  Luigi calendar - no  Turn over pill bottle - no  Phone alarm - no    Seizure Evaluation  EEG - R temporal focus - No Report  Amb EEG -   EEG\Video Monitoring -   MRI - normal - No Report  CT Scan -   PET Scan - none  Neuropsychological evaluation -   DEXA Scan - 2007 - "good"    Potential Epilepsy Risk Factors:   Pregnancy/Labor/Delivery - full term uncomplicated pregnancy labor and vaginal delivery  Febrile seizures - none  Head injury  - none  CNS infection - none     Stroke - none  Family Hx of Sz - none      PAST MEDICAL HISTORY:   Active Ambulatory Problems     Diagnosis Date Noted    Generalized convulsive epilepsy with intractable epilepsy 12/21/2012    At risk for osteoporosis/osteopenia 12/21/2012    Migraine with aura and without status migrainosus, not intractable 06/12/2013    Psoriatic arthritis 09/02/2014    Psoriasis 09/02/2014    Fructose intolerance 09/25/2014    Encounter for monitoring leflunomide therapy 12/10/2014    Vitamin D deficiency 09/15/2015    Screening for cardiovascular condition 09/02/2016    Hypertension 09/02/2016    Hyperlipidemia 09/21/2016    Onycholysis of toenail 03/29/2017    Jellyfish sting 08/04/2017     Resolved Ambulatory Problems     Diagnosis Date Noted    No Resolved Ambulatory Problems     Past Medical History:   Diagnosis Date    Abnormal Pap smear     Arthritis     Endometriosis     Endometriosis     History of chicken pox     History of shingles     Hyperlipidemia     Hypertension     IBS (irritable bowel syndrome)     Migraine     Normal vaginal delivery 8/07/2004    Pelvic pain " in female     PONV (postoperative nausea and vomiting)     Psoriasis     Psoriatic arthritis     Seizure disorder     Seizures     Vitamin D deficiency         PAST SURGICAL HISTORY including Epilepsy surgery:   Past Surgical History:   Procedure Laterality Date    CHOLECYSTECTOMY      Laparoscopic    dx lap      HYSTERECTOMY      LEEP      PELVIC LAPAROSCOPY      SALPINGOOPHORECTOMY      Left        FAMILY HISTORY:   Family History   Problem Relation Age of Onset    Psoriasis Mother     Psoriasis Sister     Diabetes Mellitus Maternal Grandmother     Heart disease Maternal Grandmother     Diabetes Maternal Grandmother     Hypertension Maternal Grandmother     Muscular dystrophy Maternal Grandfather     Hyperlipidemia Maternal Grandfather     Heart disease Paternal Grandfather     Hyperlipidemia Father     Breast cancer Neg Hx          SOCIAL HISTORY:   Social History     Social History    Marital status:      Spouse name: N/A    Number of children: N/A    Years of education: N/A     Occupational History    Not on file.     Social History Main Topics    Smoking status: Former Smoker     Quit date: 5/9/2003    Smokeless tobacco: Never Used    Alcohol use Yes      Comment: 2-3 glasses of wine per day    Drug use: No    Sexual activity: Yes     Partners: Male     Other Topics Concern    Not on file     Social History Narrative    No narrative on file      a) Marital status:                                                     b) Living situation: patient lives with her   c) Employed/Unemployed/Other: Employed full time    DRIVING HISTORY:  Currently driving: Yes      LEVEL OF EDUCATION: college graduate    SUBSTANCE USE:  Social History     Social History Main Topics    Smoking status: Former Smoker     Quit date: 5/9/2003    Smokeless tobacco: Never Used    Alcohol use Yes      Comment: 2-3 glasses of wine per day    Drug use: No    Sexual activity: Yes      Partners: Male      Social History   Substance Use Topics    Smoking status: Former Smoker     Quit date: 5/9/2003    Smokeless tobacco: Never Used    Alcohol use Yes      Comment: 2-3 glasses of wine per day        ALLERGIES: Topamax [topiramate]     LMP 07/08/2008     Higher Cortical Function:    Patient is a well developed, pleasant, well groomed individual appearing their stated age  Oriented - intact to person, place and time and followed two step instruction correctly.    Spell WORLD - Patients response: forward - WORLD; backwards -   Subtraction of serial 7s from 100 - 3 steps performed correct  Memory - Patient recalled 3 of 3 objects after 5 min.    Fund of knowledge was appropriate.    R-L Orientation - Intact   Language - Speech was fluent without evidence for an aphasia.  No agnosias, agraphesthesia, or astereognosis was present.  Extinction - not found with double simultaneous stimulation present in a proximal-distal or right-left distrubution.  Cranial Nerves II - XII:    EOMs were intact with normal smooth and no nystagmus.    PERRLA. D/C   Funduscopic exam - disc were flat with normal A/V ratio and no exudates or hemorrhages.  Motor - facial movement was symmetrical and normal.    Facial sensory - Light touch and pin prick sensations were normal.    Hearing was normal to finger rub.  Palate and tongue movement were normal    Normal power and bulk was found in the massiter and rotator muscles of the neck.  Motor: Power, bulk and tone were normal in all extremities.  Sensory: Light touch, pin prick, vibration and position senses were normal in all extremities.    Coordination:       Rapid alternating movements and rapid finger tapping - normal.       Finger to nose - nl.       Arm roll - symmetrical.    Gait:  Station, gait and tandem walking were done without difficulty and Romberg was negative.  Deep tendon reflexes:  Biceps - 2+ sym; Triceps - 2+ sym; brachioradialis - 2+ sym; Knee - 2+ sym;  Ankle - 2+ sym;  Tremor: resting, postural, intentional - none  Frontal Release signs: Glabellar - neg; Snout - neg; Root - neg; palmomental - neg; grasp - neg    IMPRESSION  Secondarily generalized convulsions -   2. Migraine without aura. - Improved  3. Systemic inflammatory disease   4. Chronic Fatigue Syndrome - improved  5. Fructose intolerance      DISPOSITION:    1. Continue followup with Dr Noel   2. Continue Supplemtation with B Vits and mg++  3.  Continue Lamictal 400 mg per day  4. Vit B12 level today -  ltg level was done earlier this year  5. RTC 12 months

## 2017-11-02 NOTE — PROGRESS NOTES
45 yo WF presents for annual gyn evaluation. Pt complains occassional urinary incontinence primarily with urge. Denies dysuria, hematuria, nocturia. Occassional MICHELL.    Past Medical History:   Diagnosis Date    Abnormal Pap smear     LEEP    Arthritis     Endometriosis     Endometriosis     History of chicken pox     History of shingles     Hyperlipidemia     Hypertension     IBS (irritable bowel syndrome)     Migraine     Normal vaginal delivery 8/07/2004    x 1    Pelvic pain in female     PONV (postoperative nausea and vomiting)     Psoriasis     Psoriatic arthritis     Seizure disorder     Seizures     Grand [place] Tonic Clonic    Vitamin D deficiency        Past Surgical History:   Procedure Laterality Date    CHOLECYSTECTOMY      Laparoscopic    dx lap      HYSTERECTOMY      LEEP      PELVIC LAPAROSCOPY      SALPINGOOPHORECTOMY      Left       Family History   Problem Relation Age of Onset    Psoriasis Mother     Psoriasis Sister     Diabetes Mellitus Maternal Grandmother     Heart disease Maternal Grandmother     Diabetes Maternal Grandmother     Hypertension Maternal Grandmother     Muscular dystrophy Maternal Grandfather     Hyperlipidemia Maternal Grandfather     Heart disease Paternal Grandfather     Hyperlipidemia Father     Breast cancer Neg Hx        Social History     Social History    Marital status:      Spouse name: N/A    Number of children: N/A    Years of education: N/A     Social History Main Topics    Smoking status: Former Smoker     Quit date: 5/9/2003    Smokeless tobacco: Never Used    Alcohol use Yes      Comment: 2-3 glasses of wine per day    Drug use: No    Sexual activity: Yes     Partners: Male     Other Topics Concern    None     Social History Narrative    None       Current Outpatient Prescriptions   Medication Sig Dispense Refill    albuterol (PROVENTIL HFA) 90 mcg/actuation inhaler INHALE 2 PUFFS BY MOUTH FOUR TIMES DAILY AS  NEEDED FOR WHEEZING 6.7 g 6    atorvastatin (LIPITOR) 10 MG tablet Take 1 tablet (10 mg total) by mouth once daily. 90 tablet 3    biotin 10,000 mcg Cap Take by mouth.      celecoxib (CELEBREX) 200 MG capsule TAKE 1 CAPSULE (200 MG TOTAL) BY MOUTH DAILY AS NEEDED. 90 capsule 0    clotrimazole-betamethasone 1-0.05% (LOTRISONE) cream Apply topically 2 (two) times daily.      ergocalciferol (VITAMIN D2) 50,000 unit Cap Take 1 capsule (50,000 Units total) by mouth every 7 days. 12 capsule 3    leflunomide (ARAVA) 20 MG Tab TAKE 1 TABLET (20 MG TOTAL) BY MOUTH ONCE DAILY. 30 tablet 0    losartan-hydrochlorothiazide 100-12.5 mg (HYZAAR) 100-12.5 mg Tab Take 1 tablet by mouth once daily. 90 tablet 3    thiamine (VITAMIN B-1) 50 MG tablet Take 50 mg by mouth once daily.      lamoTRIgine (LAMICTAL) 200 MG tablet Take 2 tablets (400 mg total) by mouth once daily. 180 tablet 3     No current facility-administered medications for this visit.        Review of patient's allergies indicates:   Allergen Reactions    Topamax [topiramate] Other (See Comments)     Numbness. Exacerbation of other symptoms       Review of System:   General: no chills, fever, night sweats, weight gain or weight loss  Psychological: no depression or suicidal ideation  Breasts: no new or changing breast lumps, nipple discharge or masses.  Respiratory: no cough, shortness of breath, or wheezing  Cardiovascular: no chest pain or dyspnea on exertion  Gastrointestinal: no abdominal pain, change in bowel habits, or black or bloody stools  Genito-Urinary:  or vulvar/vaginal symptoms, pelvic pain or abnormal vaginal bleeding. POSITIVE URGE INCONTINENCE, STRESS INCONTINENCE AND FREQUENCY  Musculoskeletal: no gait disturbance or muscular weakness    PE:   APPEARANCE: Well nourished, well developed, in no acute distress.  NECK: Neck symmetric without masses or thyromegaly.  NODES: No inguinal lymph node enlargement.  ABDOMEN: Soft. No tenderness or masses.  No hepatosplenomegaly. No hernias.  BREASTS: Symmetrical, no skin changes or visible lesions. No palpable masses, nipple discharge or adenopathy bilaterally.  PELVIC: Normal external female genitalia without lesions. Normal hair distribution. Adequate perineal body, normal urethral meatus. Vagina moist and well rugated without lesions or discharge. Grade 2 midline cystocele Grade 1 rectocele. Good bladder neck support. Neg Qtip. Uterus and cervix surgically absent. Bimanual exam revealed no masses, tenderness or abnormality.    I discussed cystocele and I discussed UI.I discussed option of Myrbetriq or other anticholenergic. Pt declines at this time. As she takes multiple meds  I did discuss surgical options as well.  Pt to have mammo screening today and monthly BSE  RTO 1 year/prn

## 2017-11-03 ENCOUNTER — LAB VISIT (OUTPATIENT)
Dept: LAB | Facility: HOSPITAL | Age: 47
End: 2017-11-03
Attending: PSYCHIATRY & NEUROLOGY
Payer: COMMERCIAL

## 2017-11-03 DIAGNOSIS — E53.8 B12 DEFICIENCY: ICD-10-CM

## 2017-11-03 DIAGNOSIS — G43.109 MIGRAINE WITH AURA AND WITHOUT STATUS MIGRAINOSUS, NOT INTRACTABLE: ICD-10-CM

## 2017-11-03 DIAGNOSIS — G40.319 GENERALIZED CONVULSIVE EPILEPSY WITH INTRACTABLE EPILEPSY: ICD-10-CM

## 2017-11-03 LAB
HPV16 AG SPEC QL: NEGATIVE
HPV16+18+H RISK 12 DNA CVX-IMP: NEGATIVE
HPV18 DNA SPEC QL NAA+PROBE: NEGATIVE
VIT B12 SERPL-MCNC: 403 PG/ML

## 2017-11-03 PROCEDURE — 84425 ASSAY OF VITAMIN B-1: CPT

## 2017-11-03 PROCEDURE — 82607 VITAMIN B-12: CPT

## 2017-11-07 LAB — VIT B1 SERPL-MCNC: 55 UG/L (ref 38–122)

## 2017-12-01 ENCOUNTER — HOSPITAL ENCOUNTER (OUTPATIENT)
Dept: RADIOLOGY | Facility: HOSPITAL | Age: 47
Discharge: HOME | End: 2017-12-01
Attending: INTERNAL MEDICINE
Payer: COMMERCIAL

## 2017-12-01 ENCOUNTER — OFFICE VISIT (OUTPATIENT)
Dept: RHEUMATOLOGY | Facility: CLINIC | Age: 47
End: 2017-12-01
Payer: COMMERCIAL

## 2017-12-01 VITALS
WEIGHT: 219.88 LBS | SYSTOLIC BLOOD PRESSURE: 153 MMHG | HEIGHT: 70 IN | BODY MASS INDEX: 31.48 KG/M2 | HEART RATE: 94 BPM | DIASTOLIC BLOOD PRESSURE: 102 MMHG

## 2017-12-01 DIAGNOSIS — I10 ESSENTIAL HYPERTENSION: ICD-10-CM

## 2017-12-01 DIAGNOSIS — G43.109 MIGRAINE WITH AURA AND WITHOUT STATUS MIGRAINOSUS, NOT INTRACTABLE: ICD-10-CM

## 2017-12-01 DIAGNOSIS — R21 RASH: ICD-10-CM

## 2017-12-01 DIAGNOSIS — L40.50 PSORIATIC ARTHRITIS: Chronic | ICD-10-CM

## 2017-12-01 DIAGNOSIS — E78.49 OTHER HYPERLIPIDEMIA: ICD-10-CM

## 2017-12-01 DIAGNOSIS — L40.9 PSORIASIS: ICD-10-CM

## 2017-12-01 DIAGNOSIS — Z51.81 ENCOUNTER FOR MONITORING LEFLUNOMIDE THERAPY: Primary | ICD-10-CM

## 2017-12-01 DIAGNOSIS — Z79.899 ENCOUNTER FOR MONITORING LEFLUNOMIDE THERAPY: Primary | ICD-10-CM

## 2017-12-01 DIAGNOSIS — E55.9 VITAMIN D DEFICIENCY: ICD-10-CM

## 2017-12-01 PROBLEM — T63.621A JELLYFISH STING: Status: RESOLVED | Noted: 2017-08-04 | Resolved: 2017-12-01

## 2017-12-01 PROCEDURE — 77077 JOINT SURVEY SINGLE VIEW: CPT | Mod: 26,,, | Performed by: RADIOLOGY

## 2017-12-01 PROCEDURE — 99999 PR PBB SHADOW E&M-EST. PATIENT-LVL III: CPT | Mod: PBBFAC,,, | Performed by: INTERNAL MEDICINE

## 2017-12-01 PROCEDURE — 77077 JOINT SURVEY SINGLE VIEW: CPT | Mod: TC

## 2017-12-01 PROCEDURE — 99214 OFFICE O/P EST MOD 30 MIN: CPT | Mod: S$GLB,,, | Performed by: INTERNAL MEDICINE

## 2017-12-01 ASSESSMENT — DISEASE ACTIVITY SCORE (DAS28)
CRP_MG_PER_LITER: 5.4
TENDER_JOINTS_COUNT: 0
GLOBAL_HEALTH_SCORE: 0
TOTAL_SCORE_ESR: 1.54
TOTAL_SCORE_CRP: 1.63
SWOLLEN_JOINTS_COUNT: 0
ESR_MM_PER_HR: 9

## 2017-12-01 NOTE — PROGRESS NOTES
"Subjective:       Patient ID: Mable Arguello is a 47 y.o. female.    Chief Complaint: PsA    HPI Doing well. Remains on leflunomide. Takes celecoxib prn for 3-7 days   Has some pain right thumb cmc, mcp, sometimes low back or hips(lateral) most reently on right. Had shingles left shoulder age 7, second episode left L5 age 24. No Zostavax. Migraines controlled. No joint pain or swelling. No infections.   Review of Systems   Constitutional: Negative for fever and unexpected weight change.   HENT: Negative for trouble swallowing.    Eyes: Negative for redness.   Respiratory: Negative for cough and shortness of breath.    Cardiovascular: Negative for chest pain.   Gastrointestinal: Negative for constipation and diarrhea.   Genitourinary: Negative for dysuria and genital sores.   Skin: Negative for rash.   Neurological: Negative for headaches.   Hematological: Does not bruise/bleed easily.         Objective:   BP (!) 153/102   Pulse 94   Ht 5' 9.6" (1.768 m)   Wt 99.7 kg (219 lb 14.4 oz)   LMP 07/08/2008   BMI 31.92 kg/m²      Physical Exam   Constitutional: She is oriented to person, place, and time and well-developed, well-nourished, and in no distress.   HENT:   Head: Normocephalic and atraumatic.   Mouth/Throat: Oropharynx is clear and moist.   Eyes: Conjunctivae and EOM are normal.   Neck: Normal range of motion. Neck supple. No thyromegaly present.   Cardiovascular: Normal rate, regular rhythm, normal heart sounds and intact distal pulses.  Exam reveals no gallop and no friction rub.    No murmur heard.  Pulmonary/Chest: Breath sounds normal. She has no wheezes. She has no rales. She exhibits no tenderness.   Abdominal: Soft. She exhibits no distension and no mass. There is no tenderness.       Right Side Rheumatological Exam     Examination finds the shoulder, elbow, wrist, knee, 1st PIP, 1st MCP, 2nd PIP, 2nd MCP, 3rd PIP, 3rd MCP, 4th PIP, 4th MCP, 5th PIP and 5th MCP normal.    Left Side " Rheumatological Exam     Examination finds the shoulder, elbow, wrist, knee, 1st PIP, 1st MCP, 2nd PIP, 2nd MCP, 3rd PIP, 3rd MCP, 4th PIP, 4th MCP, 5th PIP and 5th MCP normal.      Lymphadenopathy:     She has no cervical adenopathy.   Neurological: She is alert and oriented to person, place, and time. She displays normal reflexes. Gait normal.   Nl motor strength UE and LE bilateral   Skin: Rash (hyperpigmented macule right elbow) noted.     Scaly eczematous dermatitis heels right>left  Onycholysis all toenails  Fingernails normal   Psychiatric: Mood, memory, affect and judgment normal.   Musculoskeletal: She exhibits no edema.           Assessment/Plan         Problem List Items Addressed This Visit     Psoriatic arthritis (Chronic)    Overview                    Current Assessment & Plan     Standing labs today  Schedule previously ordered arthritis survey  Cont leflunomide 20mg daily  Cont celecoxib 200mg prn only         Migraine with aura and without status migrainosus, not intractable    Current Assessment & Plan     Well controlled at present         Psoriasis    Current Assessment & Plan     F/u with Derm for topicals for psoriatic heels and onycholysis         Encounter for monitoring leflunomide therapy - Primary    Current Assessment & Plan     Standing labs today         Relevant Orders    Sedimentation rate, manual    C-reactive protein    CBC auto differential    Comprehensive metabolic panel    Vitamin D deficiency    Current Assessment & Plan     Results for ELIER SAVAGE (MRN [id number]) as of 12/1/2017 16:12   Ref. Range 3/20/2017 07:43   Vit D, 25-Hydroxy Latest Ref Range: 30 - 96 ng/mL 32       Cont vitamin D2 50,000 units once a week         Hypertension    Current Assessment & Plan     153/102    Cont Hyzaar 100-12.5, see Dr Montoya for additional, likely CCB         Hyperlipidemia    Current Assessment & Plan     Results for ELIER SAVAGE (MRN [id number]) as of 12/1/2017 16:12   Ref.  Range 10/18/2017 07:54   Cholesterol Latest Ref Range: 120 - 199 mg/dL 249 (H)   HDL Latest Ref Range: 40 - 75 mg/dL 73   LDL Cholesterol Latest Ref Range: 63.0 - 159.0 mg/dL 157.6   Total Cholesterol/HDL Ratio Latest Ref Range: 2.0 - 5.0  3.4   Triglycerides Latest Ref Range: 30 - 150 mg/dL 92       F/u Dr. Martin will need increased dose of atorvastatin           Other Visit Diagnoses     Rash        Relevant Orders    CK

## 2017-12-01 NOTE — ASSESSMENT & PLAN NOTE
Results for HUSSEIN ELIER HOGUE (MRN [id number]) as of 12/1/2017 16:12   Ref. Range 10/18/2017 07:54   Cholesterol Latest Ref Range: 120 - 199 mg/dL 249 (H)   HDL Latest Ref Range: 40 - 75 mg/dL 73   LDL Cholesterol Latest Ref Range: 63.0 - 159.0 mg/dL 157.6   Total Cholesterol/HDL Ratio Latest Ref Range: 2.0 - 5.0  3.4   Triglycerides Latest Ref Range: 30 - 150 mg/dL 92       F/u Dr. Martin will need increased dose of atorvastatin

## 2017-12-01 NOTE — ASSESSMENT & PLAN NOTE
TJ=0 SJ=0 global=0 ESR 9 CRP 5.4 DAS28: 1.54  [id number] 1.63(both remission)  DAPSA  1.54(remission)    Standing labs today  Schedule previously ordered arthritis survey  Cont leflunomide 20mg daily  Cont celecoxib 200mg prn only

## 2017-12-01 NOTE — ASSESSMENT & PLAN NOTE
Results for ELIER SAVAGE (MRN [id number]) as of 12/1/2017 16:12   Ref. Range 3/20/2017 07:43   Vit D, 25-Hydroxy Latest Ref Range: 30 - 96 ng/mL 32       Cont vitamin D2 50,000 units once a week

## 2017-12-04 ENCOUNTER — TELEPHONE (OUTPATIENT)
Dept: RHEUMATOLOGY | Facility: CLINIC | Age: 47
End: 2017-12-04

## 2017-12-05 ENCOUNTER — TELEPHONE (OUTPATIENT)
Dept: RHEUMATOLOGY | Facility: CLINIC | Age: 47
End: 2017-12-05

## 2017-12-05 ENCOUNTER — PATIENT MESSAGE (OUTPATIENT)
Dept: RHEUMATOLOGY | Facility: CLINIC | Age: 47
End: 2017-12-05

## 2017-12-05 DIAGNOSIS — G89.29 CHRONIC LOW BACK PAIN, UNSPECIFIED BACK PAIN LATERALITY, WITH SCIATICA PRESENCE UNSPECIFIED: Primary | ICD-10-CM

## 2017-12-05 DIAGNOSIS — M25.559 ARTHRALGIA OF HIP, UNSPECIFIED LATERALITY: ICD-10-CM

## 2017-12-05 DIAGNOSIS — L40.50 PSA (PSORIATIC ARTHRITIS): ICD-10-CM

## 2017-12-05 DIAGNOSIS — M54.5 CHRONIC LOW BACK PAIN, UNSPECIFIED BACK PAIN LATERALITY, WITH SCIATICA PRESENCE UNSPECIFIED: Primary | ICD-10-CM

## 2017-12-05 NOTE — TELEPHONE ENCOUNTER
" From: Mableandre Arguello     Sent: 12/5/2017  2:00 PM CST       To: Waqar Noel MD  Subject: Non-Urgent Medical    Hi,     I went and got all the labwork and xrays Friday afternoon after appt.  I guess I thought the arthritis survey xrays included lower back and hips (I'm not sure I understand why they don't.)  The xray techs did call to ask if you would put in an order for it, and I just wanted to check if you had put it in?    It's just that the first symptoms of all this actually started in my hips (both at the joint, and where the butt muscles attach to the top of the hip - the flat part-  and lower back way back in 2008 when I was still trying to run (but it'd take me 2-3 days to recover from a 3 mile run because I would get so stiff in the hips and could barely walk.  Didn't see a rheumatologist till april 2011, but wasn't rx'd anything more than NSAIDS till May of 2014.   I just went and checked my notes that I was keeping before being diagnosed.  It was too much to keep track of.     You know I do not that I do not  want to do anymore of anything, but would you order hip/low back xrays, if you have not already?    So, just for the record/file, here's a shortened version of my notes from way back then, which I went back and checked for the exact date and timeline of everything.  Not included here are the visits (pre-diagnosis) to cardiologist (had a heart "event" and failed a stress test) and pulmonologist (extreme shortness of breath; diagnosed with tentative "restrictive lung disease; got inhaler and used several times a day):       ·       November 2008 -      o   Beginning in November 2008, after my long (4 to 5 miles) birthday run, pain and stiffness so severe could hardly walk upon waking in the morning or middle of the night; after driving in car for any length of time (especially the drive home).  Still trying to run, only once a week.  Needed time to warm up.  Stiffened upon being still. Needed " 2 to 3 days to recover from a 2 to 3 mile run.    ·       Spring 2009-    o   Saw podiatrist for toenail problem and thickening, cracking heels. Got orthotics for running because I thought my arches were falling.  turns out my joints/feet were just swollen; never used the orthotics because I couldn't get them to fit in my running shoes    ·       Dec 2010-    o   Woke up with pain in right index finger, which occasionally spread to wrist and thumb.  Pain in right foot eventually began.  Eventually became intermittent roaming joint pain in both feet, ankles, hands, wrists, (and attachment to collarbone on right side only).  (not elbows, shoulders, or hips, or knees.) had to stop running    ·       April 2011 -      o   Went to internist/ positive MARIA LUISA & elevated CRP; all other tests normal/ sent to rheumatologist who stated it was just osteoarthritis. Advised to take ibuprofen as needed, 400 mg at a time.    ·       Sept 2011 -     o   Diagnosed with psoriasis (mainly on feet); dermatologist suggested return to same rheumatologist seen before    ·       April 2012-    o   Diagnosed with psoriatic arthritis; Rheumatologist prescribed ibuprofen 800 as needed.  Worked for a few hours only    ·       May 2013-    o   Returned to rheum; Rxd feldene.  Instant improvement, including breathing almost normal, heart rate (doesnt elevate on elliptical; stayed below 130,  instead of climbing up to 145 or 150+), belly bloating reduced gastro (bms almost normal)    ·       June 22 2013 -      o   Due to increasing stiffness in lower back, hips, knees, hands and feet, started taking feldene again (after almost 2 weeks off).  Takes about 2 to 3 days to become effective.  Does alleviate stiffness, random roaming joint/tendon pain.  Get more done, think better; need less sleep, too. Heart rate goes down, too.    ·       May 3, 2014 -      o   Rxd arava & Celebrex.  At end of 2 weeks could get wedding ring on for the first time in 6  "years, and actually wear it most of the day.    Sept 2014 - started seeing Dr. Noel, after all the years of the "fun stuff"  :)    Thank you!    Brandi"

## 2017-12-11 RX ORDER — LEFLUNOMIDE 20 MG/1
20 TABLET ORAL DAILY
Qty: 90 TABLET | Refills: 0 | Status: SHIPPED | OUTPATIENT
Start: 2017-12-11 | End: 2018-03-15 | Stop reason: SDUPTHER

## 2018-01-03 ENCOUNTER — PATIENT MESSAGE (OUTPATIENT)
Dept: RHEUMATOLOGY | Facility: CLINIC | Age: 48
End: 2018-01-03

## 2018-03-15 ENCOUNTER — TELEPHONE (OUTPATIENT)
Dept: RHEUMATOLOGY | Facility: CLINIC | Age: 48
End: 2018-03-15

## 2018-03-15 DIAGNOSIS — L40.50 PSORIATIC ARTHRITIS: Chronic | ICD-10-CM

## 2018-03-15 RX ORDER — LEFLUNOMIDE 20 MG/1
20 TABLET ORAL DAILY
Qty: 90 TABLET | Refills: 0 | Status: SHIPPED | OUTPATIENT
Start: 2018-03-15 | End: 2018-06-19 | Stop reason: SDUPTHER

## 2018-03-15 RX ORDER — ERGOCALCIFEROL 1.25 MG/1
50000 CAPSULE ORAL
Qty: 12 CAPSULE | Refills: 3 | Status: SHIPPED | OUTPATIENT
Start: 2018-03-15 | End: 2019-04-11 | Stop reason: SDUPTHER

## 2018-03-15 RX ORDER — CELECOXIB 200 MG/1
200 CAPSULE ORAL DAILY PRN
Qty: 90 CAPSULE | Refills: 1 | Status: SHIPPED | OUTPATIENT
Start: 2018-03-15 | End: 2018-10-09 | Stop reason: SDUPTHER

## 2018-03-17 ENCOUNTER — LAB VISIT (OUTPATIENT)
Dept: LAB | Facility: HOSPITAL | Age: 48
End: 2018-03-17
Attending: INTERNAL MEDICINE
Payer: COMMERCIAL

## 2018-03-17 DIAGNOSIS — Z79.899 ENCOUNTER FOR MONITORING LEFLUNOMIDE THERAPY: ICD-10-CM

## 2018-03-17 DIAGNOSIS — Z51.81 ENCOUNTER FOR MONITORING LEFLUNOMIDE THERAPY: ICD-10-CM

## 2018-03-17 LAB
ALBUMIN SERPL BCP-MCNC: 3.7 G/DL
ALP SERPL-CCNC: 104 U/L
ALT SERPL W/O P-5'-P-CCNC: 25 U/L
ANION GAP SERPL CALC-SCNC: 8 MMOL/L
AST SERPL-CCNC: 19 U/L
BASOPHILS # BLD AUTO: 0.06 K/UL
BASOPHILS NFR BLD: 1.1 %
BILIRUB SERPL-MCNC: 0.4 MG/DL
BUN SERPL-MCNC: 15 MG/DL
CALCIUM SERPL-MCNC: 9.6 MG/DL
CHLORIDE SERPL-SCNC: 104 MMOL/L
CO2 SERPL-SCNC: 26 MMOL/L
CREAT SERPL-MCNC: 0.7 MG/DL
CRP SERPL-MCNC: 4 MG/L
DIFFERENTIAL METHOD: NORMAL
EOSINOPHIL # BLD AUTO: 0.4 K/UL
EOSINOPHIL NFR BLD: 7 %
ERYTHROCYTE [DISTWIDTH] IN BLOOD BY AUTOMATED COUNT: 13.3 %
ERYTHROCYTE [SEDIMENTATION RATE] IN BLOOD BY WESTERGREN METHOD: 8 MM/HR
EST. GFR  (AFRICAN AMERICAN): >60 ML/MIN/1.73 M^2
EST. GFR  (NON AFRICAN AMERICAN): >60 ML/MIN/1.73 M^2
GLUCOSE SERPL-MCNC: 103 MG/DL
HCT VFR BLD AUTO: 40.4 %
HGB BLD-MCNC: 13.2 G/DL
IMM GRANULOCYTES # BLD AUTO: 0.01 K/UL
IMM GRANULOCYTES NFR BLD AUTO: 0.2 %
LYMPHOCYTES # BLD AUTO: 1.6 K/UL
LYMPHOCYTES NFR BLD: 29.6 %
MCH RBC QN AUTO: 28.4 PG
MCHC RBC AUTO-ENTMCNC: 32.7 G/DL
MCV RBC AUTO: 87 FL
MONOCYTES # BLD AUTO: 0.4 K/UL
MONOCYTES NFR BLD: 7 %
NEUTROPHILS # BLD AUTO: 2.9 K/UL
NEUTROPHILS NFR BLD: 55.1 %
NRBC BLD-RTO: 0 /100 WBC
PLATELET # BLD AUTO: 298 K/UL
PMV BLD AUTO: 10.5 FL
POTASSIUM SERPL-SCNC: 4 MMOL/L
PROT SERPL-MCNC: 6.7 G/DL
RBC # BLD AUTO: 4.64 M/UL
SODIUM SERPL-SCNC: 138 MMOL/L
WBC # BLD AUTO: 5.3 K/UL

## 2018-03-17 PROCEDURE — 85025 COMPLETE CBC W/AUTO DIFF WBC: CPT

## 2018-03-17 PROCEDURE — 80053 COMPREHEN METABOLIC PANEL: CPT

## 2018-03-17 PROCEDURE — 85651 RBC SED RATE NONAUTOMATED: CPT | Mod: PO

## 2018-03-17 PROCEDURE — 36415 COLL VENOUS BLD VENIPUNCTURE: CPT | Mod: PO

## 2018-03-17 PROCEDURE — 86140 C-REACTIVE PROTEIN: CPT

## 2018-05-21 ENCOUNTER — TELEPHONE (OUTPATIENT)
Dept: RHEUMATOLOGY | Facility: CLINIC | Age: 48
End: 2018-05-21

## 2018-05-21 ENCOUNTER — PATIENT MESSAGE (OUTPATIENT)
Dept: RHEUMATOLOGY | Facility: CLINIC | Age: 48
End: 2018-05-21

## 2018-05-21 DIAGNOSIS — L40.9 PSORIASIS: Primary | ICD-10-CM

## 2018-05-21 RX ORDER — CLOBETASOL PROPIONATE 0.5 MG/G
CREAM TOPICAL 2 TIMES DAILY
Qty: 16 G | Refills: 0 | Status: SHIPPED | OUTPATIENT
Start: 2018-05-21 | End: 2019-05-07 | Stop reason: SDUPTHER

## 2018-06-19 ENCOUNTER — TELEPHONE (OUTPATIENT)
Dept: RHEUMATOLOGY | Facility: CLINIC | Age: 48
End: 2018-06-19

## 2018-06-19 RX ORDER — LEFLUNOMIDE 20 MG/1
20 TABLET ORAL DAILY
Qty: 30 TABLET | Refills: 0 | Status: SHIPPED | OUTPATIENT
Start: 2018-06-19 | End: 2018-08-06 | Stop reason: SDUPTHER

## 2018-06-21 ENCOUNTER — TELEPHONE (OUTPATIENT)
Dept: RHEUMATOLOGY | Facility: CLINIC | Age: 48
End: 2018-06-21

## 2018-06-21 ENCOUNTER — HOSPITAL ENCOUNTER (OUTPATIENT)
Dept: RADIOLOGY | Facility: HOSPITAL | Age: 48
Discharge: HOME | End: 2018-06-21
Attending: INTERNAL MEDICINE
Payer: COMMERCIAL

## 2018-06-21 DIAGNOSIS — M25.551 HIP PAIN, BILATERAL: ICD-10-CM

## 2018-06-21 DIAGNOSIS — M54.5 LOW BACK PAIN, UNSPECIFIED BACK PAIN LATERALITY, UNSPECIFIED CHRONICITY, WITH SCIATICA PRESENCE UNSPECIFIED: ICD-10-CM

## 2018-06-21 DIAGNOSIS — M25.552 HIP PAIN, BILATERAL: ICD-10-CM

## 2018-06-21 DIAGNOSIS — L40.50 PSA (PSORIATIC ARTHRITIS): ICD-10-CM

## 2018-06-21 DIAGNOSIS — L40.50 PSA (PSORIATIC ARTHRITIS): Primary | ICD-10-CM

## 2018-06-21 PROCEDURE — 72100 X-RAY EXAM L-S SPINE 2/3 VWS: CPT | Mod: TC,FY,PO

## 2018-06-21 PROCEDURE — 73521 X-RAY EXAM HIPS BI 2 VIEWS: CPT | Mod: TC,FY,PO

## 2018-06-21 PROCEDURE — 73521 X-RAY EXAM HIPS BI 2 VIEWS: CPT | Mod: 26,,, | Performed by: RADIOLOGY

## 2018-06-21 PROCEDURE — 72100 X-RAY EXAM L-S SPINE 2/3 VWS: CPT | Mod: 26,,, | Performed by: RADIOLOGY

## 2018-06-22 ENCOUNTER — TELEPHONE (OUTPATIENT)
Dept: RHEUMATOLOGY | Facility: CLINIC | Age: 48
End: 2018-06-22

## 2018-06-22 DIAGNOSIS — M25.551 RIGHT HIP PAIN: Primary | ICD-10-CM

## 2018-06-22 DIAGNOSIS — M24.051: ICD-10-CM

## 2018-07-09 ENCOUNTER — OFFICE VISIT (OUTPATIENT)
Dept: ORTHOPEDICS | Facility: CLINIC | Age: 48
End: 2018-07-09
Payer: COMMERCIAL

## 2018-07-09 VITALS — BODY MASS INDEX: 31.21 KG/M2 | HEIGHT: 70 IN | WEIGHT: 218 LBS

## 2018-07-09 DIAGNOSIS — M54.5 LOW BACK PAIN, UNSPECIFIED BACK PAIN LATERALITY, UNSPECIFIED CHRONICITY, WITH SCIATICA PRESENCE UNSPECIFIED: ICD-10-CM

## 2018-07-09 DIAGNOSIS — M16.0 OSTEOARTHRITIS OF BOTH HIPS, UNSPECIFIED OSTEOARTHRITIS TYPE: Primary | ICD-10-CM

## 2018-07-09 PROCEDURE — 99243 OFF/OP CNSLTJ NEW/EST LOW 30: CPT | Mod: S$GLB,,, | Performed by: ORTHOPAEDIC SURGERY

## 2018-07-09 PROCEDURE — 99999 PR PBB SHADOW E&M-EST. PATIENT-LVL II: CPT | Mod: PBBFAC,,, | Performed by: ORTHOPAEDIC SURGERY

## 2018-07-09 NOTE — PROGRESS NOTES
HISTORY OF PRESENT ILLNESS:  47 years old, seen as a consult from Dr. Noel,   complaining of pain, multiple arthralgias, back into her hips.  She had this now   for years.  She is referred in to our clinic for x-ray report of calcification   around the right hip area.    PHYSICAL EXAMINATION:  Today shows that her hip range of motion is painless.    She is tender throughout her body.    X-rays show well preserved joint spacing of the hips.  There is ossicle around   the lateral portion of the acetabulum.    ASSESSMENT:  Multiple arthralgias.    PLAN:  We will recommend continued therapy and strengthening.  Follow up in   [facility] on an as-needed basis.      PBB/HN  dd: 07/09/2018 10:03:22 (CDT)  td: 07/10/2018 00:46:07 (CDT)  Doc ID   #[id number]  Job ID #[id number]    CC:     Further History  Aching pain  Worse with activity  Relieved with rest  No other associated symptoms  No other radiation    Further Exam  Alert and oriented  Pleasant  Contralateral limb has appropriate range of motion for age and condition  Contralateral limb has appropriate strength for age and condition  Contralateral limb has appropriate stability  for age and condition  No adenopathy  Pulses are appropriate for current condition  Skin is intact        Chief Complaint    Chief Complaint   Patient presents with    Right Hip - Pain    Left Hip - Pain       HPI  Mable Arguello is a 47 y.o.  female who presents with       Past Medical History  Past Medical History:   Diagnosis Date    Abnormal Pap smear     LEEP    Arthritis     Endometriosis     Endometriosis     History of chicken pox     History of shingles     Hyperlipidemia     Hypertension     IBS (irritable bowel syndrome)     Migraine     Normal vaginal delivery 8/07/2004    x 1    Pelvic pain in female     PONV (postoperative nausea and vomiting)     Psoriasis     Psoriatic arthritis     Seizure disorder     Seizures     [place] [employer]onic     Vitamin D deficiency        Past Surgical History  Past Surgical History:   Procedure Laterality Date    CHOLECYSTECTOMY      Laparoscopic    dx lap      HYSTERECTOMY      LEEP      PELVIC LAPAROSCOPY      SALPINGOOPHORECTOMY      Left       Medications  Current Outpatient Prescriptions   Medication Sig    albuterol (PROVENTIL HFA) 90 mcg/actuation inhaler INHALE 2 PUFFS BY MOUTH FOUR TIMES DAILY AS NEEDED FOR WHEEZING    atorvastatin (LIPITOR) 10 MG tablet TAKE 1 TABLET (10 MG TOTAL) BY MOUTH ONCE DAILY.    biotin 10,000 mcg Cap Take by mouth.    celecoxib (CELEBREX) 200 MG capsule TAKE 1 CAPSULE (200 MG TOTAL) BY MOUTH DAILY AS NEEDED.    clotrimazole-betamethasone 1-0.05% (LOTRISONE) cream Apply topically 2 (two) times daily.    lamoTRIgine (LAMICTAL) 200 MG tablet Take 2 tablets (400 mg total) by mouth once daily.    leflunomide (ARAVA) 20 MG Tab TAKE 1 TABLET (20 MG TOTAL) BY MOUTH ONCE DAILY.    losartan-hydrochlorothiazide 100-12.5 mg (HYZAAR) 100-12.5 mg Tab TAKE 1 TABLET BY MOUTH ONCE DAILY.    thiamine (VITAMIN B-1) 50 MG tablet Take 50 mg by mouth once daily.    VITAMIN D2 50,000 unit capsule TAKE 1 CAPSULE (50,000 UNITS TOTAL) BY MOUTH EVERY 7 DAYS.    clobetasol (TEMOVATE) 0.05 % cream Apply topically 2 (two) times daily.     No current facility-administered medications for this visit.        Allergies  Review of patient's allergies indicates:   Allergen Reactions    Topamax [topiramate] Other (See Comments)     Numbness. Exacerbation of other symptoms       Family History  Family History   Problem Relation Age of Onset    Psoriasis Mother     Psoriasis Sister     Diabetes Mellitus Maternal Grandmother     Heart disease Maternal Grandmother     Diabetes Maternal Grandmother     Hypertension Maternal Grandmother     Muscular dystrophy Maternal Grandfather     Hyperlipidemia Maternal Grandfather     Heart disease Paternal Grandfather     Hyperlipidemia Father     Breast cancer  Neg Hx        Social History  Social History     Social History    Marital status:      Spouse name: N/A    Number of children: N/A    Years of education: N/A     Occupational History    Not on file.     Social History Main Topics    Smoking status: Former Smoker     Quit date: 5/9/2003    Smokeless tobacco: Never Used    Alcohol use Yes      Comment: 2-3 glasses of wine per day    Drug use: No    Sexual activity: Yes     Partners: Male     Other Topics Concern    Not on file     Social History Narrative    No narrative on file               Review of Systems     Constitutional: Negative    HENT: Negative  Eyes: Negative  Respiratory: Negative  Cardiovascular: Negative  Musculoskeletal: HPI  Skin: Negative  Neurological: Negative  Hematological: Negative  Endocrine: Negative                 Physical Exam    There were no vitals filed for this visit.  Body mass index is 31.73 kg/m².  Physical Examination:     General appearance -  well appearing, and in no distress  Mental status - awake  Neck - supple  Chest -  symmetric air entry  Heart - normal rate   Abdomen - soft      Assessment     1. Osteoarthritis of both hips, unspecified osteoarthritis type    2. Low back pain, unspecified back pain laterality, unspecified chronicity, with sciatica presence unspecified          Plan

## 2018-07-09 NOTE — LETTER
July 9, 2018      Waqar Noel MD  [address]  [facility] [address]           [facility]  [address] [facility] [address] [place] [address]  Phone: [phone]          Patient: Mable Arguello   MR Number: [id number]   YOB: 1970   Date of Visit: 7/9/2018       Dear Dr. Waqar Noel:    Thank you for referring Mable Arguello to me for evaluation. Attached you will find relevant portions of my assessment and plan of care.    If you have questions, please do not hesitate to call me. I look forward to following Mable Arguello along with you.    Sincerely,    Alton Hudson MD    Enclosure  CC:  No Recipients    If you would like to receive this communication electronically, please contact [email or web address] or [phone] to request more information on [employer] access.    For providers and/or their staff who would like to refer a patient to [facility], please contact us through our one-stop-shop provider referral line, [facility] , at [phone].    If you feel you have received this communication in error or would no longer like to receive these types of communications, please e-mail [email or web address]

## 2018-08-06 RX ORDER — LEFLUNOMIDE 20 MG/1
TABLET ORAL
Qty: 30 TABLET | Refills: 0 | Status: SHIPPED | OUTPATIENT
Start: 2018-08-06 | End: 2018-09-11 | Stop reason: SDUPTHER

## 2018-09-11 ENCOUNTER — TELEPHONE (OUTPATIENT)
Dept: RHEUMATOLOGY | Facility: CLINIC | Age: 48
End: 2018-09-11

## 2018-09-11 RX ORDER — LEFLUNOMIDE 20 MG/1
TABLET ORAL
Qty: 30 TABLET | Refills: 0 | Status: SHIPPED | OUTPATIENT
Start: 2018-09-11 | End: 2018-10-16 | Stop reason: SDUPTHER

## 2018-09-21 ENCOUNTER — LAB VISIT (OUTPATIENT)
Dept: LAB | Facility: HOSPITAL | Age: 48
End: 2018-09-21
Attending: INTERNAL MEDICINE
Payer: COMMERCIAL

## 2018-09-21 DIAGNOSIS — Z79.899 ENCOUNTER FOR MONITORING LEFLUNOMIDE THERAPY: ICD-10-CM

## 2018-09-21 DIAGNOSIS — Z51.81 ENCOUNTER FOR MONITORING LEFLUNOMIDE THERAPY: ICD-10-CM

## 2018-09-21 LAB
ALBUMIN SERPL BCP-MCNC: 4 G/DL
ALP SERPL-CCNC: 114 U/L
ALT SERPL W/O P-5'-P-CCNC: 23 U/L
ANION GAP SERPL CALC-SCNC: 6 MMOL/L
AST SERPL-CCNC: 19 U/L
BASOPHILS # BLD AUTO: 0.08 K/UL
BASOPHILS NFR BLD: 1.1 %
BILIRUB SERPL-MCNC: 0.3 MG/DL
BUN SERPL-MCNC: 15 MG/DL
CALCIUM SERPL-MCNC: 10.1 MG/DL
CHLORIDE SERPL-SCNC: 103 MMOL/L
CO2 SERPL-SCNC: 31 MMOL/L
CREAT SERPL-MCNC: 0.8 MG/DL
CRP SERPL-MCNC: 3.6 MG/L
DIFFERENTIAL METHOD: NORMAL
EOSINOPHIL # BLD AUTO: 0.2 K/UL
EOSINOPHIL NFR BLD: 3.4 %
ERYTHROCYTE [DISTWIDTH] IN BLOOD BY AUTOMATED COUNT: 13.1 %
ERYTHROCYTE [SEDIMENTATION RATE] IN BLOOD BY WESTERGREN METHOD: 7 MM/HR
EST. GFR  (AFRICAN AMERICAN): >60 ML/MIN/1.73 M^2
EST. GFR  (NON AFRICAN AMERICAN): >60 ML/MIN/1.73 M^2
GLUCOSE SERPL-MCNC: 110 MG/DL
HCT VFR BLD AUTO: 41.5 %
HGB BLD-MCNC: 13.7 G/DL
IMM GRANULOCYTES # BLD AUTO: 0.01 K/UL
IMM GRANULOCYTES NFR BLD AUTO: 0.1 %
LYMPHOCYTES # BLD AUTO: 2.2 K/UL
LYMPHOCYTES NFR BLD: 30.7 %
MCH RBC QN AUTO: 29.1 PG
MCHC RBC AUTO-ENTMCNC: 33 G/DL
MCV RBC AUTO: 88 FL
MONOCYTES # BLD AUTO: 0.4 K/UL
MONOCYTES NFR BLD: 5.9 %
NEUTROPHILS # BLD AUTO: 4.2 K/UL
NEUTROPHILS NFR BLD: 58.8 %
NRBC BLD-RTO: 0 /100 WBC
PLATELET # BLD AUTO: 297 K/UL
PMV BLD AUTO: 10.8 FL
POTASSIUM SERPL-SCNC: 3.5 MMOL/L
PROT SERPL-MCNC: 7.3 G/DL
RBC # BLD AUTO: 4.7 M/UL
SODIUM SERPL-SCNC: 140 MMOL/L
WBC # BLD AUTO: 7.13 K/UL

## 2018-09-21 PROCEDURE — 85025 COMPLETE CBC W/AUTO DIFF WBC: CPT

## 2018-09-21 PROCEDURE — 80053 COMPREHEN METABOLIC PANEL: CPT

## 2018-09-21 PROCEDURE — 85651 RBC SED RATE NONAUTOMATED: CPT | Mod: PO

## 2018-09-21 PROCEDURE — 86140 C-REACTIVE PROTEIN: CPT

## 2018-09-21 PROCEDURE — 36415 COLL VENOUS BLD VENIPUNCTURE: CPT | Mod: PO

## 2018-10-09 DIAGNOSIS — L40.50 PSORIATIC ARTHRITIS: Chronic | ICD-10-CM

## 2018-10-09 RX ORDER — CELECOXIB 200 MG/1
200 CAPSULE ORAL DAILY PRN
Qty: 90 CAPSULE | Refills: 1 | Status: SHIPPED | OUTPATIENT
Start: 2018-10-09 | End: 2019-04-17 | Stop reason: SDUPTHER

## 2018-10-16 RX ORDER — LEFLUNOMIDE 20 MG/1
TABLET ORAL
Qty: 30 TABLET | Refills: 1 | Status: SHIPPED | OUTPATIENT
Start: 2018-10-16 | End: 2019-07-25 | Stop reason: SDUPTHER

## 2018-11-21 RX ORDER — LAMOTRIGINE 200 MG/1
400 TABLET ORAL DAILY
Qty: 180 TABLET | Refills: 3 | Status: SHIPPED | OUTPATIENT
Start: 2018-11-21 | End: 2019-03-28 | Stop reason: SDUPTHER

## 2019-01-22 ENCOUNTER — PATIENT MESSAGE (OUTPATIENT)
Dept: RHEUMATOLOGY | Facility: CLINIC | Age: 49
End: 2019-01-22

## 2019-01-22 ENCOUNTER — PATIENT MESSAGE (OUTPATIENT)
Dept: NEUROLOGY | Facility: CLINIC | Age: 49
End: 2019-01-22

## 2019-01-22 ENCOUNTER — TELEPHONE (OUTPATIENT)
Dept: RHEUMATOLOGY | Facility: CLINIC | Age: 49
End: 2019-01-22

## 2019-03-01 PROBLEM — J01.00 ACUTE MAXILLARY SINUSITIS: Status: ACTIVE | Noted: 2019-03-01

## 2019-03-01 PROBLEM — J33.0 POLYP OF NASAL CAVITY: Status: ACTIVE | Noted: 2019-03-01

## 2019-03-01 PROBLEM — J32.9 CHRONIC INFECTION OF SINUS: Status: ACTIVE | Noted: 2019-03-01

## 2019-03-28 ENCOUNTER — OFFICE VISIT (OUTPATIENT)
Dept: NEUROLOGY | Facility: CLINIC | Age: 49
End: 2019-03-28
Payer: COMMERCIAL

## 2019-03-28 VITALS
BODY MASS INDEX: 31.09 KG/M2 | DIASTOLIC BLOOD PRESSURE: 101 MMHG | HEART RATE: 86 BPM | HEIGHT: 69 IN | SYSTOLIC BLOOD PRESSURE: 174 MMHG | WEIGHT: 209.88 LBS

## 2019-03-28 DIAGNOSIS — E53.8 B12 DEFICIENCY: ICD-10-CM

## 2019-03-28 DIAGNOSIS — G43.109 MIGRAINE WITH AURA AND WITHOUT STATUS MIGRAINOSUS, NOT INTRACTABLE: ICD-10-CM

## 2019-03-28 DIAGNOSIS — G40.319 GENERALIZED CONVULSIVE EPILEPSY WITH INTRACTABLE EPILEPSY: Primary | ICD-10-CM

## 2019-03-28 PROCEDURE — 3080F PR MOST RECENT DIASTOLIC BLOOD PRESSURE >= 90 MM HG: ICD-10-PCS | Mod: CPTII,S$GLB,, | Performed by: PSYCHIATRY & NEUROLOGY

## 2019-03-28 PROCEDURE — 99999 PR PBB SHADOW E&M-EST. PATIENT-LVL II: CPT | Mod: PBBFAC,,, | Performed by: PSYCHIATRY & NEUROLOGY

## 2019-03-28 PROCEDURE — 99214 PR OFFICE/OUTPT VISIT, EST, LEVL IV, 30-39 MIN: ICD-10-PCS | Mod: S$GLB,,, | Performed by: PSYCHIATRY & NEUROLOGY

## 2019-03-28 PROCEDURE — 3077F PR MOST RECENT SYSTOLIC BLOOD PRESSURE >= 140 MM HG: ICD-10-PCS | Mod: CPTII,S$GLB,, | Performed by: PSYCHIATRY & NEUROLOGY

## 2019-03-28 PROCEDURE — 99999 PR PBB SHADOW E&M-EST. PATIENT-LVL II: ICD-10-PCS | Mod: PBBFAC,,, | Performed by: PSYCHIATRY & NEUROLOGY

## 2019-03-28 PROCEDURE — 3077F SYST BP >= 140 MM HG: CPT | Mod: CPTII,S$GLB,, | Performed by: PSYCHIATRY & NEUROLOGY

## 2019-03-28 PROCEDURE — 99214 OFFICE O/P EST MOD 30 MIN: CPT | Mod: S$GLB,,, | Performed by: PSYCHIATRY & NEUROLOGY

## 2019-03-28 PROCEDURE — 3080F DIAST BP >= 90 MM HG: CPT | Mod: CPTII,S$GLB,, | Performed by: PSYCHIATRY & NEUROLOGY

## 2019-03-28 PROCEDURE — 3008F BODY MASS INDEX DOCD: CPT | Mod: CPTII,S$GLB,, | Performed by: PSYCHIATRY & NEUROLOGY

## 2019-03-28 PROCEDURE — 3008F PR BODY MASS INDEX (BMI) DOCUMENTED: ICD-10-PCS | Mod: CPTII,S$GLB,, | Performed by: PSYCHIATRY & NEUROLOGY

## 2019-03-28 RX ORDER — DOXYCYCLINE 150 MG/1
CAPSULE ORAL
COMMUNITY
Start: 2019-03-20 | End: 2019-05-07

## 2019-03-28 RX ORDER — TOBRAMYCIN 40 MG/ML
INJECTION INTRAMUSCULAR; INTRAVENOUS
COMMUNITY
Start: 2019-03-22 | End: 2019-05-07

## 2019-03-28 RX ORDER — NITROFURANTOIN MACROCRYSTALS 25 MG/1
CAPSULE ORAL
COMMUNITY
Start: 2019-03-20 | End: 2019-05-20

## 2019-03-28 RX ORDER — LAMOTRIGINE 200 MG/1
400 TABLET ORAL DAILY
Qty: 180 TABLET | Refills: 3 | Status: SHIPPED | OUTPATIENT
Start: 2019-03-28 | End: 2020-04-28

## 2019-03-28 RX ORDER — BUDESONIDE 1 MG/2ML
1 INHALANT ORAL 2 TIMES DAILY PRN
COMMUNITY
Start: 2019-03-20

## 2019-03-28 NOTE — PROGRESS NOTES
Name: Elier Arguello  MRN: [id number]   CSN: [id number]      Date: 03/28/2019    HISTORY OF PRESENT ILLNESS (HPI)    Interval Histories    2019/03/28  Patient continues to do well and remains seizure free.  She occasionally has migraine aura but without headache and these last only minutes.  She does not have a strategy for adherence but despite this nosz have occurred.  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 She had B12 deficiency and takes supplemental B12 daily.  Last documented level was low.    2017/11/01  The patient remains on 400 mg of Lamictal daily.  Levels have been stable for the past 3 yrs.  She continues to take B-complex as she had been deficient in B12.  Last sz was in 2004.  Topiramate was changed to Lamictal due to paresthesia in her limbs.      Results for ELIER ARGUELLO (MRN [id number]) as of 11/1/2017 13:14   Ref. Range 3/11/2015 14:05 12/19/2015 09:15 10/4/2016 07:31 3/20/2017 07:43   Lamotrigine Lvl Latest Ref Range: 2.0 - 15.0 ug/mL 7.8 3.8 5.1 4.5   Results for ELIER ARGUELLO (MRN [id number]) as of 11/1/2017 13:14   Ref. Range 6/12/2013 15:30 9/25/2014 11:22   Vitamin B-12 Latest Ref Range: 210 - 950 pg/mL 280 376     2016/03/9   Pt arrives to clinic alone today.  She says she has remained seizure free for 11 years.  Previously she had remained seizure free for 14 years though she had an event during her last pregnancy at that time.  Says that her migraines finally have stopped.  Says that she had her last one in March.  Says they used to happen monthly, even after a hysterectomy.  However, now she says that she has not had an event in months.  She expresses a hope that maybe she has gone through menopause, which could bear responsibility for this positive change in her symptoms.    Says her last psoriatic arthritis flare occurred between January and march of this year, with her symptoms of chronic fatigue worsening concomitantly.  Her symptoms are much improved now.     She  remains on lamictal 400mg PO daily.  Denies side effects presently.  She takes it daily with her cup of coffee at her desk every morning.  She takes it with her coffee at breakfast over the weekends.  When she forgets to take her meds, she simply takes a double dose.     Her symptoms of numbness and tingling of the fingers have resolved since stopping topamax. Denies any requests for changes, or new needs in clinic today.      2014/09/24   Last visit the Topiramate was stopped and Lamotrigine was started.  The tingling in fingers/toes stopped.  Her hair stated to fall out and the Derm she saw thought it was a combination of Vera and Lamictal.  He started her on Rogane which she uses daily.     She had a colonoscopy and was found by the GI to be fructose intolerant.  She avoids fruits that contain fructose and she has started to feel better.  She is no longer easily fatigued which started in 2008.  She had GI symptoms of irritable bowel, diarrhea, constipation and incontinence which has improved with the change in diet.  Leg cramps which occur mainly at night have dramatically improved.  She use to need to use an inhaler for SOB but with the diet changes has not needed to continue using it.     She continues to have once a month symptom complex of tingling of her scalp, visual change (Tunnel vision), feeling a weight is over her, with change in mood, depression, nausea and vomiting.  Headache is a minor part of the complex.  These will last only a couple of minutes.  She has experienced one occasion of post coital exploding headache.  She is taking B-vit supplements and Mg.      Epilepsy History   The patient is a 48 y.o. yo Right handed  female referred for consultation by Dr. Nathan Sam.  The patient was unaccompanied and she provided all of the history.  Seizures started at 11 years of age.  She had convulsions that have been controlled with Tegretol XR.  Last seizure was in 2004 and was 3 weeks after  "delivery of her daughter and before that the previous seizure was at age 21 (1992).   Aura consisted of tingling down the left side of her body into her toes, tunnel vision, and nausea.  She had a history of severe headaches which occur after a convulsion.  She has a a strong family history of migraine in her maternal aunt and her daughter.     She reports that she use to have "dizzy spells" which had occurred around her menses.  She has had her ovaries removed in Mar 2-12.  The initial symptoms were dizziness with associated nausea and subsequent vomiting.  She becomes confused and blank out.  I reviewed the records from Dr Sam and she had EEGs done years ago that did show right temporal sharp waves.  Although she has been seizure free for years the presence of an epileptic focus dictates continued treatment.      Seizure Seminology  Seizure Type 1  Classification:   Aura - visual perceptual change  Ictus  - Nonconv -  - Conv - generalized jerking with tongue biting  - Duration -   Post-ictal  - Symptoms- fatigue.  - Duration- two days.  Seizure Frequency - last one GTC after her daughter was borne 2004.  Age of onset - 11y age    Seizure Triggers  Sleep Deprivation - None  Other medicaitons - None  Psych/stress - None  Photic stimulation - None  Hyperventilation - None  Medical Problems - None  Menses - No  Sensory Stimulation (light, sound, etc)     AED Treatments  Present regimen  lamotrigine (Lamictal, LTG)     Results for ELIER SAVAGE (MRN [id number]) as of 1/8/2014 13:58   Ref. Range 6/12/2013 15:30   Thiamine Latest Range:  ug/L 53   Vitamin B2 Latest Range: 1-19 mcg/L 4   Vitamin B6 Latest Range: 5-50 ug/L 7   Vit D, 25-Hydroxy Latest Range: 30-96 ng/mL 18 (L)       Prior treatments  carbamazepine (Tegretol, CBZ) -  mg 3 BID- she can take only brand Tegretol XR.      Not tried  acetazolamide (Diamox, AZM)  ethosuximide (Zarontin, ESM)  gabapentin (Neurontin, GPN)  lacosamide (Vimpat, LCS) " "  lamotrigine (Lamictal, LTG)   levetiracetam (Keppra, LEV)  methsuximide (Celontin, MSM)  methyphenytoin (Mesantion, MHT)  oxcarbazepine (Trileptal OXC)  phenobarbital (Pb)  phenytoin (Dilantin, PHT)  pregabalin (Lyrica, PGB)   primidone (Mysoline, PRM)  retigabine (Potiga, RTG)  rufinamide (Banzel, RUF)  tiagabine (Gabatril,  TGB)  topiramate (Topamax, TPM)  viagabatrin, (Sabril, VGB)  vagal nerve stimulator (VNS)  valproic acid (Depakote, VPA)  zonisamide (Zonegran, ZNA)  Benzodiazepines  diazepam - rectal (Diastatl)  diazepam - oral (Valium, DZ)  clonazepam (Klonopin, CZP)  clorazepate (Tranxene, CLZ)  clobezam (Frizium, CLB)  Ativan  Brain Stimulation  Vagal Nerve Stimulato  DBS    Compliance method  Memory - yes  Pill Box - no  Luigi calendar - no  Turn over pill bottle - no  Phone alarm - no    Seizure Evaluation  EEG - R temporal focus - No Report  Amb EEG -   EEG\Video Monitoring -   MRI - normal - No Report  CT Scan -   PET Scan - none  Neuropsychological evaluation -   DEXA Scan - 2007 - "good"    Potential Epilepsy Risk Factors:   Pregnancy/Labor/Delivery - full term uncomplicated pregnancy labor and vaginal delivery  Febrile seizures - none  Head injury  - none  CNS infection - none     Stroke - none  Family Hx of Sz - none      PAST MEDICAL HISTORY:   Active Ambulatory Problems     Diagnosis Date Noted    Generalized convulsive epilepsy with intractable epilepsy 12/21/2012    At risk for osteoporosis/osteopenia 12/21/2012    Migraine with aura and without status migrainosus, not intractable 06/12/2013    Psoriatic arthritis 09/02/2014    Psoriasis 09/02/2014    Fructose intolerance 09/25/2014    Encounter for monitoring leflunomide therapy 12/10/2014    Vitamin D deficiency 09/15/2015    Screening for cardiovascular condition 09/02/2016    Hypertension 09/02/2016    Hyperlipidemia 09/21/2016    Onycholysis of toenail 03/29/2017    B12 deficiency 11/01/2017    Acute maxillary sinusitis " 03/01/2019    Chronic infection of sinus 03/01/2019    Polyp of nasal cavity 03/01/2019     Resolved Ambulatory Problems     Diagnosis Date Noted    Jellyfish sting 08/04/2017     Past Medical History:   Diagnosis Date    Abnormal Pap smear     Arthritis     Endometriosis     History of chicken pox     History of shingles     Hyperlipidemia     Hypertension     IBS (irritable bowel syndrome)     Malabsorption     Migraine     Normal vaginal delivery 8/07/2004    Pelvic pain in female     PONV (postoperative nausea and vomiting)     Psoriasis     Psoriatic arthritis     Seizure disorder     Seizures     Vitamin D deficiency         PAST SURGICAL HISTORY including Epilepsy surgery:   Past Surgical History:   Procedure Laterality Date    CHOLECYSTECTOMY      Laparoscopic    dx lap      EXCISION, NASAL TURBINATE, SUBMUCOSAL N/A 3/1/2019    [facility]    FESS, USING COMPUTER-ASSISTED NAVIGATION, Balloon Sinuplasty N/A 3/1/2019    [facility]formed [facility]    HYSTERECTOMY      LAPAROSCOPY, DIAGNOSTIC N/A 3/8/2012    [facility]    LEEP      PELVIC LAPAROSCOPY      POLYPECTOMY N/A 3/1/2019    [facility]    SALPINGO-OOPHORECTOMY Right 3/8/2012    [facility]    SALPINGOOPHORECTOMY      Left    SEPTOPLASTY, NOSE N/A 3/1/2019    [facility] at [facility]        FAMILY HISTORY:   Family History   Problem Relation Age of Onset    Psoriasis Mother     Psoriasis Sister     Diabetes Mellitus Maternal Grandmother     Heart disease Maternal Grandmother     Diabetes Maternal Grandmother     Hypertension Maternal Grandmother     Muscular dystrophy Maternal Grandfather     Hyperlipidemia Maternal Grandfather     Heart disease Paternal Grandfather     Hyperlipidemia Father     Breast cancer Neg Hx          SOCIAL  HISTORY:   Social History     Socioeconomic History    Marital status:      Spouse name: Not on file    Number of children: Not on file    Years of education: Not on file    Highest education level: Not on file   Occupational History    Not on file   Social Needs    Financial resource strain: Not on file    Food insecurity:     Worry: Not on file     Inability: Not on file    Transportation needs:     Medical: Not on file     Non-medical: Not on file   Tobacco Use    Smoking status: Former Smoker     Last attempt to quit: 5/9/2003     Years since quitting: 15.8    Smokeless tobacco: Never Used   Substance and Sexual Activity    Alcohol use: Yes     Comment: 2-3 glasses of wine per day    Drug use: No    Sexual activity: Yes     Partners: Male   Lifestyle    Physical activity:     Days per week: Not on file     Minutes per session: Not on file    Stress: Not on file   Relationships    Social connections:     Talks on phone: Not on file     Gets together: Not on file     Attends Religion service: Not on file     Active member of club or organization: Not on file     Attends meetings of clubs or organizations: Not on file     Relationship status: Not on file    Intimate partner violence:     Fear of current or ex partner: Not on file     Emotionally abused: Not on file     Physically abused: Not on file     Forced sexual activity: Not on file   Other Topics Concern    Not on file   Social History Narrative    Not on file      a) Marital status:                                                     b) Living situation: patient lives with her   c) Employed/Unemployed/Other: Employed full time    DRIVING HISTORY:  Currently driving: Yes      LEVEL OF EDUCATION: college graduate    SUBSTANCE USE:  Social History     Tobacco Use    Smoking status: Former Smoker     Last attempt to quit: 5/9/2003     Years since quitting: 15.8    Smokeless tobacco: Never Used   Substance and Sexual  "Activity    Alcohol use: Yes     Comment: 2-3 glasses of wine per day    Drug use: No    Sexual activity: Yes     Partners: Male      Social History     Tobacco Use    Smoking status: Former Smoker     Last attempt to quit: 5/9/2003     Years since quitting: 15.8    Smokeless tobacco: Never Used   Substance Use Topics    Alcohol use: Yes     Comment: 2-3 glasses of wine per day        ALLERGIES: Topamax [topiramate]     BP (!) 174/101   Pulse 86   Ht 5' 9" (1.753 m)   Wt 95.2 kg (209 lb 14.1 oz)   LMP 07/08/2008   BMI 30.99 kg/m²     Higher Cortical Function:    Patient is a well developed, pleasant, well groomed individual appearing their stated age  Oriented - intact to person, place and time and followed two step instruction correctly.    Spell WORLD - Patients response: forward - WORLD; backwards -   Subtraction of serial 7s from 100 - 3 steps performed correct  Memory - Patient recalled 3 of 3 objects after 5 min.    Fund of knowledge was appropriate.    R-L Orientation - Intact   Language - Speech was fluent without evidence for an aphasia.  No agnosias, agraphesthesia, or astereognosis was present.  Extinction - not found with double simultaneous stimulation present in a proximal-distal or right-left distrubution.  Cranial Nerves II - XII:    EOMs were intact with normal smooth and no nystagmus.    PERRLA. D/C   Funduscopic exam - disc were flat with normal A/V ratio and no exudates or hemorrhages.  Motor - facial movement was symmetrical and normal.    Facial sensory - Light touch and pin prick sensations were normal.    Hearing was normal to finger rub.  Palate and tongue movement were normal    Normal power and bulk was found in the massiter and rotator muscles of the neck.  Motor: Power, bulk and tone were normal in all extremities.  Sensory: Light touch, pin prick, vibration and position senses were normal in all extremities.    Coordination:       Rapid alternating movements and rapid finger " tapping - normal.       Finger to nose - nl.       Arm roll - symmetrical.    Gait:  Station, gait and tandem walking were done without difficulty and Romberg was negative.  Deep tendon reflexes:  Biceps - 2+ sym; Triceps - 2+ sym; brachioradialis - 2+ sym; Knee - 2+ sym; Ankle - 2+ sym;  Tremor: resting, postural, intentional - none  Frontal Release signs: Glabellar - neg; Snout - neg; Root - neg; palmomental - neg; grasp - neg    IMPRESSION  Secondarily generalized convulsions -   2. Migraine without aura. - Improved  3. Systemic inflammatory disease   4. Chronic Fatigue Syndrome - improved  5. Fructose intolerance      DISPOSITION:    1. Continue followup with Dr Noel   2. Continue Supplemtation with B Vits and mg++  3.  Continue Lamictal 400 mg per day  4. Vit B12 level today -  ltg level was done earlier this year  5. RTC 12 months

## 2019-04-11 ENCOUNTER — TELEPHONE (OUTPATIENT)
Dept: RHEUMATOLOGY | Facility: CLINIC | Age: 49
End: 2019-04-11

## 2019-04-11 DIAGNOSIS — E55.9 VITAMIN D DEFICIENCY: Primary | ICD-10-CM

## 2019-04-11 RX ORDER — ERGOCALCIFEROL 1.25 MG/1
50000 CAPSULE ORAL
Qty: 12 CAPSULE | Refills: 3 | Status: SHIPPED | OUTPATIENT
Start: 2019-04-11

## 2019-04-11 NOTE — TELEPHONE ENCOUNTER
Please schedule overdue standing labs and vitamin D this week or early next, and also needs f/u so I can refill meds. Thanks TARA

## 2019-04-17 DIAGNOSIS — L40.50 PSORIATIC ARTHRITIS: Chronic | ICD-10-CM

## 2019-04-17 RX ORDER — CELECOXIB 200 MG/1
200 CAPSULE ORAL DAILY PRN
Qty: 90 CAPSULE | Refills: 1 | Status: SHIPPED | OUTPATIENT
Start: 2019-04-17 | End: 2019-10-21 | Stop reason: SDUPTHER

## 2019-05-02 ENCOUNTER — LAB VISIT (OUTPATIENT)
Dept: LAB | Facility: HOSPITAL | Age: 49
End: 2019-05-02
Attending: INTERNAL MEDICINE
Payer: COMMERCIAL

## 2019-05-02 DIAGNOSIS — Z79.899 ENCOUNTER FOR MONITORING LEFLUNOMIDE THERAPY: ICD-10-CM

## 2019-05-02 DIAGNOSIS — Z51.81 ENCOUNTER FOR MONITORING LEFLUNOMIDE THERAPY: ICD-10-CM

## 2019-05-02 LAB
ALBUMIN SERPL BCP-MCNC: 4 G/DL (ref 3.5–5.2)
ALP SERPL-CCNC: 121 U/L (ref 55–135)
ALT SERPL W/O P-5'-P-CCNC: 20 U/L (ref 10–44)
ANION GAP SERPL CALC-SCNC: 13 MMOL/L (ref 8–16)
AST SERPL-CCNC: 17 U/L (ref 10–40)
BASOPHILS # BLD AUTO: 0.06 K/UL (ref 0–0.2)
BASOPHILS NFR BLD: 1 % (ref 0–1.9)
BILIRUB SERPL-MCNC: 0.4 MG/DL (ref 0.1–1)
BUN SERPL-MCNC: 16 MG/DL (ref 6–20)
CALCIUM SERPL-MCNC: 10 MG/DL (ref 8.7–10.5)
CHLORIDE SERPL-SCNC: 103 MMOL/L (ref 95–110)
CO2 SERPL-SCNC: 23 MMOL/L (ref 23–29)
CREAT SERPL-MCNC: 0.7 MG/DL (ref 0.5–1.4)
CRP SERPL-MCNC: 5.4 MG/L (ref 0–8.2)
DIFFERENTIAL METHOD: ABNORMAL
EOSINOPHIL # BLD AUTO: 0.2 K/UL (ref 0–0.5)
EOSINOPHIL NFR BLD: 3.3 % (ref 0–8)
ERYTHROCYTE [DISTWIDTH] IN BLOOD BY AUTOMATED COUNT: 13.3 % (ref 11.5–14.5)
ERYTHROCYTE [SEDIMENTATION RATE] IN BLOOD BY WESTERGREN METHOD: 8 MM/HR (ref 0–20)
EST. GFR  (AFRICAN AMERICAN): >60 ML/MIN/1.73 M^2
EST. GFR  (NON AFRICAN AMERICAN): >60 ML/MIN/1.73 M^2
GLUCOSE SERPL-MCNC: 98 MG/DL (ref 70–110)
HCT VFR BLD AUTO: 42 % (ref 37–48.5)
HGB BLD-MCNC: 13.2 G/DL (ref 12–16)
IMM GRANULOCYTES # BLD AUTO: 0.02 K/UL (ref 0–0.04)
IMM GRANULOCYTES NFR BLD AUTO: 0.3 % (ref 0–0.5)
LYMPHOCYTES # BLD AUTO: 1.8 K/UL (ref 1–4.8)
LYMPHOCYTES NFR BLD: 27.9 % (ref 18–48)
MCH RBC QN AUTO: 28.4 PG (ref 27–31)
MCHC RBC AUTO-ENTMCNC: 31.4 G/DL (ref 32–36)
MCV RBC AUTO: 91 FL (ref 82–98)
MONOCYTES # BLD AUTO: 0.5 K/UL (ref 0.3–1)
MONOCYTES NFR BLD: 7.9 % (ref 4–15)
NEUTROPHILS # BLD AUTO: 3.8 K/UL (ref 1.8–7.7)
NEUTROPHILS NFR BLD: 59.6 % (ref 38–73)
NRBC BLD-RTO: 0 /100 WBC
PLATELET # BLD AUTO: 322 K/UL (ref 150–350)
PMV BLD AUTO: 10.8 FL (ref 9.2–12.9)
POTASSIUM SERPL-SCNC: 4 MMOL/L (ref 3.5–5.1)
PROT SERPL-MCNC: 7.3 G/DL (ref 6–8.4)
RBC # BLD AUTO: 4.64 M/UL (ref 4–5.4)
SODIUM SERPL-SCNC: 139 MMOL/L (ref 136–145)
WBC # BLD AUTO: 6.3 K/UL (ref 3.9–12.7)

## 2019-05-02 PROCEDURE — 36415 COLL VENOUS BLD VENIPUNCTURE: CPT | Mod: PO

## 2019-05-02 PROCEDURE — 85651 RBC SED RATE NONAUTOMATED: CPT | Mod: PO

## 2019-05-02 PROCEDURE — 80053 COMPREHEN METABOLIC PANEL: CPT

## 2019-05-02 PROCEDURE — 85025 COMPLETE CBC W/AUTO DIFF WBC: CPT

## 2019-05-02 PROCEDURE — 86140 C-REACTIVE PROTEIN: CPT

## 2019-05-07 ENCOUNTER — OFFICE VISIT (OUTPATIENT)
Dept: RHEUMATOLOGY | Facility: CLINIC | Age: 49
End: 2019-05-07
Payer: COMMERCIAL

## 2019-05-07 ENCOUNTER — TELEPHONE (OUTPATIENT)
Dept: RHEUMATOLOGY | Facility: CLINIC | Age: 49
End: 2019-05-07

## 2019-05-07 ENCOUNTER — LAB VISIT (OUTPATIENT)
Dept: LAB | Facility: HOSPITAL | Age: 49
End: 2019-05-07
Attending: INTERNAL MEDICINE
Payer: COMMERCIAL

## 2019-05-07 ENCOUNTER — PATIENT MESSAGE (OUTPATIENT)
Dept: RHEUMATOLOGY | Facility: CLINIC | Age: 49
End: 2019-05-07

## 2019-05-07 ENCOUNTER — TELEPHONE (OUTPATIENT)
Dept: PHARMACY | Facility: CLINIC | Age: 49
End: 2019-05-07

## 2019-05-07 VITALS
HEIGHT: 70 IN | HEART RATE: 80 BPM | SYSTOLIC BLOOD PRESSURE: 162 MMHG | DIASTOLIC BLOOD PRESSURE: 95 MMHG | WEIGHT: 216.13 LBS | BODY MASS INDEX: 30.94 KG/M2

## 2019-05-07 DIAGNOSIS — L40.50 PSA (PSORIATIC ARTHRITIS): ICD-10-CM

## 2019-05-07 DIAGNOSIS — E78.00 PURE HYPERCHOLESTEROLEMIA: ICD-10-CM

## 2019-05-07 DIAGNOSIS — L40.9 PSORIASIS: ICD-10-CM

## 2019-05-07 DIAGNOSIS — L60.1 ONYCHOLYSIS OF TOENAIL: ICD-10-CM

## 2019-05-07 DIAGNOSIS — E55.9 VITAMIN D DEFICIENCY: ICD-10-CM

## 2019-05-07 DIAGNOSIS — L40.50 PSA (PSORIATIC ARTHRITIS): Primary | ICD-10-CM

## 2019-05-07 LAB
25(OH)D3+25(OH)D2 SERPL-MCNC: 31 NG/ML (ref 30–96)
CHOLEST SERPL-MCNC: 240 MG/DL (ref 120–199)
CHOLEST/HDLC SERPL: 2.5 {RATIO} (ref 2–5)
HDLC SERPL-MCNC: 96 MG/DL (ref 40–75)
HDLC SERPL: 40 % (ref 20–50)
LDLC SERPL CALC-MCNC: 124.4 MG/DL (ref 63–159)
NONHDLC SERPL-MCNC: 144 MG/DL
TRIGL SERPL-MCNC: 98 MG/DL (ref 30–150)

## 2019-05-07 PROCEDURE — 86803 HEPATITIS C AB TEST: CPT

## 2019-05-07 PROCEDURE — 3077F PR MOST RECENT SYSTOLIC BLOOD PRESSURE >= 140 MM HG: ICD-10-PCS | Mod: CPTII,S$GLB,, | Performed by: INTERNAL MEDICINE

## 2019-05-07 PROCEDURE — 87340 HEPATITIS B SURFACE AG IA: CPT

## 2019-05-07 PROCEDURE — 3008F BODY MASS INDEX DOCD: CPT | Mod: CPTII,S$GLB,, | Performed by: INTERNAL MEDICINE

## 2019-05-07 PROCEDURE — 99214 OFFICE O/P EST MOD 30 MIN: CPT | Mod: S$GLB,,, | Performed by: INTERNAL MEDICINE

## 2019-05-07 PROCEDURE — 86706 HEP B SURFACE ANTIBODY: CPT

## 2019-05-07 PROCEDURE — 86790 VIRUS ANTIBODY NOS: CPT

## 2019-05-07 PROCEDURE — 82306 VITAMIN D 25 HYDROXY: CPT

## 2019-05-07 PROCEDURE — 86592 SYPHILIS TEST NON-TREP QUAL: CPT

## 2019-05-07 PROCEDURE — 99999 PR PBB SHADOW E&M-EST. PATIENT-LVL III: ICD-10-PCS | Mod: PBBFAC,,, | Performed by: INTERNAL MEDICINE

## 2019-05-07 PROCEDURE — 3080F PR MOST RECENT DIASTOLIC BLOOD PRESSURE >= 90 MM HG: ICD-10-PCS | Mod: CPTII,S$GLB,, | Performed by: INTERNAL MEDICINE

## 2019-05-07 PROCEDURE — 36415 COLL VENOUS BLD VENIPUNCTURE: CPT

## 2019-05-07 PROCEDURE — 86682 HELMINTH ANTIBODY: CPT

## 2019-05-07 PROCEDURE — 86787 VARICELLA-ZOSTER ANTIBODY: CPT

## 2019-05-07 PROCEDURE — 86703 HIV-1/HIV-2 1 RESULT ANTBDY: CPT

## 2019-05-07 PROCEDURE — 99999 PR PBB SHADOW E&M-EST. PATIENT-LVL III: CPT | Mod: PBBFAC,,, | Performed by: INTERNAL MEDICINE

## 2019-05-07 PROCEDURE — 80061 LIPID PANEL: CPT

## 2019-05-07 PROCEDURE — 3080F DIAST BP >= 90 MM HG: CPT | Mod: CPTII,S$GLB,, | Performed by: INTERNAL MEDICINE

## 2019-05-07 PROCEDURE — 99214 PR OFFICE/OUTPT VISIT, EST, LEVL IV, 30-39 MIN: ICD-10-PCS | Mod: S$GLB,,, | Performed by: INTERNAL MEDICINE

## 2019-05-07 PROCEDURE — 86704 HEP B CORE ANTIBODY TOTAL: CPT

## 2019-05-07 PROCEDURE — 3077F SYST BP >= 140 MM HG: CPT | Mod: CPTII,S$GLB,, | Performed by: INTERNAL MEDICINE

## 2019-05-07 PROCEDURE — 3008F PR BODY MASS INDEX (BMI) DOCUMENTED: ICD-10-PCS | Mod: CPTII,S$GLB,, | Performed by: INTERNAL MEDICINE

## 2019-05-07 RX ORDER — ADALIMUMAB 40MG/0.8ML
40 KIT SUBCUTANEOUS
Qty: 2 PEN | Refills: 2 | Status: SHIPPED | OUTPATIENT
Start: 2019-05-07 | End: 2019-05-15

## 2019-05-07 RX ORDER — CLOBETASOL PROPIONATE 0.5 MG/G
1 CREAM TOPICAL 2 TIMES DAILY PRN
Qty: 16 G | Refills: 2 | Status: SHIPPED | OUTPATIENT
Start: 2019-05-07 | End: 2019-11-06 | Stop reason: SDUPTHER

## 2019-05-07 ASSESSMENT — DISEASE ACTIVITY SCORE (DAS28)
SWOLLEN_JOINTS_COUNT: 0
TENDER_JOINTS_COUNT: 3
GLOBAL_HEALTH_SCORE: 5.4
ESR_MM_PER_HR: 8
TENDER_JOINTS_COUNT: 4
TOTAL_SCORE_ESR: 1.46
TOTAL_SCORE_CRP: 1.63
CRP_MG_PER_LITER: 5.4
TENDER_JOINTS_COUNT: 0
CRP_MG_PER_LITER: 5.4
GLOBAL_HEALTH_SCORE: 0
ESR_MM_PER_HR: 8
ESR_MM_PER_HR: 8

## 2019-05-07 ASSESSMENT — ROUTINE ASSESSMENT OF PATIENT INDEX DATA (RAPID3)
PATIENT GLOBAL ASSESSMENT SCORE: 0
PAIN SCORE: 3.5
FATIGUE SCORE: 0
AM STIFFNESS SCORE: 1, YES
PSYCHOLOGICAL DISTRESS SCORE: 0
MDHAQ FUNCTION SCORE: .6
TOTAL RAPID3 SCORE: 1.83
WHEN YOU AWAKENED IN THE MORNING OVER THE LAST WEEK, PLEASE INDICATE THE AMOUNT OF TIME IT TAKES UNTIL YOU ARE AS LIMBER AS YOU WILL BE FOR THE DAY: 20 MINS

## 2019-05-07 NOTE — TELEPHONE ENCOUNTER
informed patient that [facility] received prescription for Humira and prior authorization is required.  [facility] will be back in touch once insurance determination is received.

## 2019-05-07 NOTE — PROGRESS NOTES
I have personally taken the history and examined the patient and agree with the resident,s note as stated above     Results for ELIER SAVAGE (MRN [id number]) as of 5/7/2019 09:36   Ref. Range 5/2/2019 07:29   WBC Latest Ref Range: 3.90 - 12.70 K/uL 6.30   RBC Latest Ref Range: 4.00 - 5.40 M/uL 4.64   Hemoglobin Latest Ref Range: 12.0 - 16.0 g/dL 13.2   Hematocrit Latest Ref Range: 37.0 - 48.5 % 42.0   MCV Latest Ref Range: 82 - 98 fL 91   MCH Latest Ref Range: 27.0 - 31.0 pg 28.4   MCHC Latest Ref Range: 32.0 - 36.0 g/dL 31.4 (L)   RDW Latest Ref Range: 11.5 - 14.5 % 13.3   Platelets Latest Ref Range: 150 - 350 K/uL 322   MPV Latest Ref Range: 9.2 - 12.9 fL 10.8   Gran% Latest Ref Range: 38.0 - 73.0 % 59.6   Gran # (ANC) Latest Ref Range: 1.8 - 7.7 K/uL 3.8   Lymph% Latest Ref Range: 18.0 - 48.0 % 27.9   Lymph # Latest Ref Range: 1.0 - 4.8 K/uL 1.8   Mono% Latest Ref Range: 4.0 - 15.0 % 7.9   Mono # Latest Ref Range: 0.3 - 1.0 K/uL 0.5   Eosinophil% Latest Ref Range: 0.0 - 8.0 % 3.3   Eos # Latest Ref Range: 0.0 - 0.5 K/uL 0.2   Basophil% Latest Ref Range: 0.0 - 1.9 % 1.0   Baso # Latest Ref Range: 0.00 - 0.20 K/uL 0.06   nRBC Latest Ref Range: 0 /100 WBC 0   Differential Method Unknown Automated   Immature Grans (Abs) Latest Ref Range: 0.00 - 0.04 K/uL 0.02   Immature Granulocytes Latest Ref Range: 0.0 - 0.5 % 0.3   Sed Rate Latest Ref Range: 0 - 20 mm/Hr 8   Sodium Latest Ref Range: 136 - 145 mmol/L 139   Potassium Latest Ref Range: 3.5 - 5.1 mmol/L 4.0   Chloride Latest Ref Range: 95 - 110 mmol/L 103   CO2 Latest Ref Range: 23 - 29 mmol/L 23   Anion Gap Latest Ref Range: 8 - 16 mmol/L 13   BUN, Bld Latest Ref Range: 6 - 20 mg/dL 16   Creatinine Latest Ref Range: 0.5 - 1.4 mg/dL 0.7   eGFR if non African American Latest Ref Range: >60 mL/min/1.73 m^2 >60.0   eGFR if African American Latest Ref Range: >60 mL/min/1.73 m^2 >60.0   Glucose Latest Ref Range: 70 - 110 mg/dL 98   Calcium Latest Ref Range: 8.7 - 10.5  mg/dL 10.0   Alkaline Phosphatase Latest Ref Range: 55 - 135 U/L 121   PROTEIN TOTAL Latest Ref Range: 6.0 - 8.4 g/dL 7.3   Albumin Latest Ref Range: 3.5 - 5.2 g/dL 4.0   BILIRUBIN TOTAL Latest Ref Range: 0.1 - 1.0 mg/dL 0.4   AST Latest Ref Range: 10 - 40 U/L 17   ALT Latest Ref Range: 10 - 44 U/L 20   CRP Latest Ref Range: 0.0 - 8.2 mg/L 5.4     Notes Recorded by Waqar Noel MD on 12/1/2017 at 5:49 PM CST  The x-rays look quite good with minimal age appropriate degenerative changes in neck, small finger joints, knees and great toes. No definite changes of psoriatic arthritis. RJQ          PACS Images for [facility]er      Show images for XR Arthritis Survey   PACS Images for [facility] Viewer (Includes [facility] Images)      Show images for XR Arthritis Survey   All Reviewers List     Waqar Noel MD on 12/1/2017 17:49   Result Notes for XR Arthritis Survey     Notes Recorded by Waqar Noel MD on 12/1/2017 at 5:49 PM CST  The x-rays look quite good with minimal age appropriate degenerative changes in neck, small finger joints, knees and great toes. No definite changes of psoriatic arthritis. RJQ   XR Arthritis Survey   Order: [id number]   Status:  Final result   Visible to patient:  Yes (Patient Portal)   Next appt:  05/16/2019 at 02:15 PM in Internal Medicine (Rusty Montoya MD)   Dx:  Psoriatic arthritis   Details     Reading Physician Reading Date Result Priority   Tejas Pardo MD 12/1/2017       Narrative     Comparison is made to prior examination dated 12/9/15.    A flexion radiograph of the cervical spine, standing AP views of both knees, PA views of both hands, and AP views of both feet were obtained. There are mild degenerative changes of the cervical spine. Minimal degenerative changes of the femorotibial joints are noted with preservation of the joint spaces. There are minimal degenerative changes within the small joints of the hands with no bony erosions  are identified. There is healed fracture deformity of the left fifth metatarsal bone. There are mild degenerative changes within the small joints of the feet and this is most pronounced at the first metatarsal phalangeal joints.      Impression       Mild degenerative changes. No bony erosions are identified.  Healed fracture deformity of the left fifth metatarsal bone.       Electronically signed by: VISHNU JUNE MD  Date: 12/01/17  Time: 17:32             Last Resulted: 12/01/17 17:32   Order Details View Encounter Lab and Collection Details Routing Result History            Patient Result Comments     Viewed by Mable Arguello on 7/5/2018  1:27 AM   Written by Waqar Noel MD on 12/1/2017  5:49 PM   The x-rays look quite good with minimal age appropriate degenerative changes in neck, small finger joints, knees and great toes. No definite changes of psoriatic arthritis. RJQ   External Result Report     External Result Report   Narrative     Comparison is made to prior examination dated 12/9/15.    A flexion radiograph of the cervical spine, standing AP views of both knees, PA views of both hands, and AP views of both feet were obtained. There are mild degenerative changes of the cervical spine. Minimal degenerative changes of the femorotibial joints are noted with preservation of the joint spaces. There are minimal degenerative changes within the small joints of the hands with no bony erosions are identified. There is healed fracture deformity of the left fifth metatarsal bone. There are mild degenerative changes within the small joints of the feet and this is most pronounced at the first metatarsal phalangeal joints.   Impression       Mild degenerative changes. No bony erosions are identified.  Healed fracture deformity of the left fifth metatarsal bone.       Electronically signed by: VISHNU JUNE MD  Date: 12/01/17  Time: 17:32     Encounter     View Encounter               Results for HUSSEIN  ELIER HOGUE (MRN [id number]) as of 5/7/2019 09:36   Ref. Range 6/21/2018 07:56   Cholesterol Latest Ref Range: 120 - 199 mg/dL 219 (H)   HDL Latest Ref Range: 40 - 75 mg/dL 76 (H)   Hdl/Cholesterol Ratio Latest Ref Range: 20.0 - 50.0 % 34.7   LDL Cholesterol Latest Ref Range: 63.0 - 159.0 mg/dL 117.6   Non-HDL Cholesterol Latest Units: mg/dL 143   Total Cholesterol/HDL Ratio Latest Ref Range: 2.0 - 5.0  2.9   Triglycerides Latest Ref Range: 30 - 150 mg/dL 127           PsA TJ 0  SJ 0  Pt global 0  ESR 8  CRP 5.4  DAS28 1.46  [id number] 1.63(both remission)  DAPSA  TJ 0  SJ 0 Pt global 0 Pt pain 3.5  CRP 0.54= 4.04(remission-  LDA)  Psoriasis; plantar psoriasis bilateral onycholysis toenails  Hypertension 174/98 then 163/95  Hyperlipidemia on atorvastatin 10mg daily  Hypertension on Hyzaar 100-12.5mg daily    See primary for severe hypertension as scheduled next week  Pre-DMARD labs, vitamin D, lipid panel  FAST TRACK to ID for immunization update  leflunomide 20mg daily  Celecoxib 200mg daily prn  Clobetasol twice daily prn  RTC 3 months with standing labs favor starting adalimumab in place of leflunomide

## 2019-05-07 NOTE — PROGRESS NOTES
Mable Arguello  1970        Subjective     Chief Complaint: Psoriasis, joint pain    History of Present Illness:  Ms. Mable Arguello is a 48 y.o. female with PMH of HTN, HLD and psoriatic arthritis came in to day for her annual F/U.    Patient had a surgery on 3/1 for her nasal sinuses and stopped her Leflunomide and selecoxib and she started to have Rt elbow and wrist pain and Left knee ~ two weeks ago. The pain is mainly when she moves the joint by lifting objects and handling files at work. She admits having occasional morning stiffness, about 30 minutes that only affects her back. She denies any swelling or rash and states no other joint pain except her lower back. She has not done any exersize in a while as it causes her feet to swell. She denies weight changes, night sweats or fatigue.   In the last summer, patient noted a guttate rash in the inner aspect of her Rt thigh that went away and did not return since. She provided picture through her phone.  Her recent labs indicate normal inflammatory markers including ESR 8 and CRP 4.5    She also mentions giving the fact she is taking AED, she had to prioritize medications, including HTN meds. Her BP today 162/95. Patient is asymptomatic with no chest pain, SOB, headache, lightheadedness or palpitation. She has a F/U with her PCP in 10 days.      Review of Systems   Constitutional: Negative for chills, diaphoresis, fever and malaise/fatigue.   HENT: Negative for congestion, ear pain and sinus pain.    Eyes: Negative for blurred vision and double vision.   Respiratory: Negative for cough, shortness of breath and wheezing.    Cardiovascular: Negative for chest pain, palpitations and leg swelling.   Gastrointestinal: Negative for abdominal pain, constipation, diarrhea, nausea and vomiting.   Genitourinary: Negative for dysuria and hematuria.   Musculoskeletal: Positive for back pain and joint pain. Negative for falls, myalgias and neck pain.   Skin:  Negative for itching and rash.   Neurological: Negative for dizziness, tingling, weakness and headaches.   Psychiatric/Behavioral: The patient is not nervous/anxious.        PAST HISTORY:     Past Medical History:   Diagnosis Date    Abnormal Pap smear     LEEP    Arthritis     Endometriosis     History of chicken pox     History of shingles     Hyperlipidemia     Hypertension     IBS (irritable bowel syndrome)     Malabsorption     Migraine     Normal vaginal delivery 8/07/2004    x 1    Pelvic pain in female     PONV (postoperative nausea and vomiting)     Psoriasis     Psoriatic arthritis     Seizure disorder     Seizures     Grand Mal Tonic Clonic last 2004    Vitamin D deficiency        Past Surgical History:   Procedure Laterality Date    CHOLECYSTECTOMY      Laparoscopic    dx lap      EXCISION, NASAL TURBINATE, SUBMUCOSAL N/A 3/1/2019    [facility]    FESS, USING COMPUTER-ASSISTED NAVIGATION, Balloon Sinuplasty N/A 3/1/2019    [facility]    HYSTERECTOMY      LAPAROSCOPY, DIAGNOSTIC N/A 3/8/2012    [facility]    LEEP      PELVIC LAPAROSCOPY      POLYPECTOMY N/A 3/1/2019    [facility]    SALPINGO-OOPHORECTOMY Right 3/8/2012    [facility]    SALPINGOOPHORECTOMY      Left    SEPTOPLASTY, NOSE N/A 3/1/2019    [facility]       Family History   Problem Relation Age of Onset    Psoriasis Mother     Psoriasis Sister     Diabetes Mellitus Maternal Grandmother     Heart disease Maternal Grandmother     Diabetes Maternal Grandmother     Hypertension Maternal Grandmother     Muscular dystrophy Maternal Grandfather     Hyperlipidemia Maternal Grandfather     Heart disease Paternal Grandfather     Hyperlipidemia Father     Breast cancer Neg Hx        Social History      Socioeconomic History    Marital status:      Spouse name: Not on file    Number of children: Not on file    Years of education: Not on file    Highest education level: Not on file   Occupational History    Not on file   Social Needs    Financial resource strain: Not on file    Food insecurity:     Worry: Not on file     Inability: Not on file    Transportation needs:     Medical: Not on file     Non-medical: Not on file   Tobacco Use    Smoking status: Former Smoker     Last attempt to quit: 2003     Years since quittin.0    Smokeless tobacco: Never Used   Substance and Sexual Activity    Alcohol use: Yes     Comment: 2-3 glasses of wine per day    Drug use: No    Sexual activity: Yes     Partners: Male   Lifestyle    Physical activity:     Days per week: Not on file     Minutes per session: Not on file    Stress: Not on file   Relationships    Social connections:     Talks on phone: Not on file     Gets together: Not on file     Attends Denominational service: Not on file     Active member of club or organization: Not on file     Attends meetings of clubs or organizations: Not on file     Relationship status: Not on file   Other Topics Concern    Not on file   Social History Narrative    Not on file       MEDICATIONS & ALLERGIES:     Current Outpatient Medications on File Prior to Visit   Medication Sig    albuterol (PROVENTIL HFA) 90 mcg/actuation inhaler INHALE 2 PUFFS BY MOUTH FOUR TIMES DAILY AS NEEDED FOR WHEEZING    atorvastatin (LIPITOR) 10 MG tablet TAKE 1 TABLET (10 MG TOTAL) BY MOUTH ONCE DAILY.    biotin 10,000 mcg Cap Take 1 capsule by mouth once daily.     budesonide 1 mg/2 mL NbSp     celecoxib (CELEBREX) 200 MG capsule TAKE 1 CAPSULE (200 MG TOTAL) BY MOUTH DAILY AS NEEDED.    clotrimazole-betamethasone 1-0.05% (LOTRISONE) cream Apply 1 g topically 2 (two) times daily as needed.     lamoTRIgine (LAMICTAL) 200 MG tablet Take 2 tablets (400 mg total) by mouth  "once daily.    leflunomide (ARAVA) 20 MG Tab TAKE 1 TABLET BY MOUTH EVERY DAY    losartan-hydrochlorothiazide 100-12.5 mg (HYZAAR) 100-12.5 mg Tab TAKE 1 TABLET BY MOUTH ONCE DAILY.    nitrofurantoin (MACRODANTIN) 25 MG Cap     thiamine (VITAMIN B-1) 50 MG tablet Take 50 mg by mouth once daily.    VITAMIN D2 50,000 unit capsule TAKE 1 CAPSULE (50,000 UNITS TOTAL) BY MOUTH EVERY 7 DAYS.    [DISCONTINUED] doxycycline monohydrate 150 mg Cap     [DISCONTINUED] HYDROcodone-acetaminophen (NORCO) 5-325 mg per tablet Take 1 tablet by mouth every 6 (six) hours as needed for Pain.    [DISCONTINUED] clobetasol (TEMOVATE) 0.05 % cream Apply topically 2 (two) times daily. (Patient taking differently: Apply 1 application topically 2 (two) times daily as needed. )    [DISCONTINUED] tobramycin (NEBCIN) 40 mg/mL injection      No current facility-administered medications on file prior to visit.        Review of patient's allergies indicates:   Allergen Reactions    Topamax [topiramate] Other (See Comments)     Numbness. Exacerbation of other symptoms       OBJECTIVE:     Vital Signs:  Vitals:    05/07/19 0936   BP: (!) 162/95   Pulse: 80   Weight: 98 kg (216 lb 1.6 oz)   Height: 5' 9.6" (1.768 m)       Body mass index is 31.36 kg/m².     Physical Exam:  General:  Well developed, well nourished, no acute distress  Head: Normocephalic, atraumatic  Eyes: PERRL, EOMI, clear sclera  Throat: No posterior pharyngeal erythema or exudate, no tonsillar exudate  Neck: supple, normal ROM, no thyromegaly   CVS:  RRR, S1 and S2 normal, no murmurs, rubs, gallops, 2+ peripheral pulses  Resp:  Lungs clear to auscultation, no wheezes, rales, rhonchi, cough  GI:  Abdomen soft, non-tender, non-distended, normoactive bowel sounds    MSK:    Rt upper extremity: No point tenderness, rash, deformity or swelling noted. Normal ROM   Left knee: No swelling, rash, deformity or point tenderness. Normal ROM  Feet noted for scaly rash on the soles " consistent with psoriasis. She was noted also for Onycholysis of toenail  No muscle atrophy, cyanosis, peripheral edema     Skin:  No ulcers, erythema  Neuro:  CNII-XII grossly intact, no focal deficits noted  Psych:  Appropriate mood and affect, normal judgement    Laboratory  Lab Results   Component Value Date    WBC 6.30 05/02/2019    HGB 13.2 05/02/2019    HCT 42.0 05/02/2019    MCV 91 05/02/2019     05/02/2019     Lab Results   Component Value Date    GLU 98 05/02/2019     05/02/2019    K 4.0 05/02/2019     05/02/2019    CO2 23 05/02/2019    BUN 16 05/02/2019    CREATININE 0.7 05/02/2019    CALCIUM 10.0 05/02/2019     Diagnostic Results:  Labs: Reviewed    ASSESSMENT & PLAN:   Ms. Mable Arguello is a 48 y.o. female with PMH of HTN, HLD and psoriatic arthritis came in to day for her annual F/U.    PSA (psoriatic arthritis)  PsA TJ 0  SJ 0  Pt global 0  ESR 8  CRP 5.4  DAS28 1.46  ECJ89-HLT 1.63(both remission)    -     XR Arthritis Survey; Future; Expected date: 05/07/2019  -     HIV 1/2 Ag/Ab (4th Gen); Future; Expected date: 05/07/2019  -     Hepatitis B surface antigen; Future; Expected date: 05/07/2019  -     Hepatitis B core antibody, total; Future; Expected date: 05/07/2019  -     Hepatitis B surface antibody; Future; Expected date: 05/07/2019  -     Hepatitis C antibody; Future; Expected date: 05/07/2019  -     Hepatitis A antibody, IgG; Future; Expected date: 05/07/2019  -     Strongyloides IgG Antibodies; Future; Expected date: 05/07/2019  -     Quantiferon Gold TB; Future; Expected date: 05/07/2019  -     RPR; Future; Expected date: 05/07/2019  -     Varicella zoster antibody, IgG; Future; Expected date: 05/07/2019  -     Ambulatory referral to Infectious Disease    Pure hypercholesterolemia  -     LIPID PANEL; Future; Expected date: 05/07/2019    Psoriasis  -     clobetasol (TEMOVATE) 0.05 % cream; Apply 1 application topically 2 (two) times daily as needed.  Dispense: 16 g;  Refill: 2    Onycholysis of toenail      RTC in 3 months    Aaron Rivera MD  Internal Medicine PGY1  [facility]  [address] [place] [address]  [phone]  Attending Physician:

## 2019-05-07 NOTE — TELEPHONE ENCOUNTER
Please have pt get the pre-DMARD labs and FAST TRACK to ID asap, prior to starting Humira. Rx sent to [facility]. Please tell pt she can stop the leflunomide once she starts the Humira. Thanks TAAR

## 2019-05-08 ENCOUNTER — HOSPITAL ENCOUNTER (OUTPATIENT)
Dept: RADIOLOGY | Facility: HOSPITAL | Age: 49
Discharge: HOME | End: 2019-05-08
Attending: INTERNAL MEDICINE
Payer: COMMERCIAL

## 2019-05-08 ENCOUNTER — TELEPHONE (OUTPATIENT)
Dept: RHEUMATOLOGY | Facility: CLINIC | Age: 49
End: 2019-05-08

## 2019-05-08 DIAGNOSIS — L40.50 PSA (PSORIATIC ARTHRITIS): ICD-10-CM

## 2019-05-08 LAB
HBV CORE AB SERPL QL IA: NEGATIVE
HBV SURFACE AB SER-ACNC: NEGATIVE M[IU]/ML
HBV SURFACE AG SERPL QL IA: NEGATIVE
HCV AB SERPL QL IA: NEGATIVE
HEPATITIS A ANTIBODY, IGG: NEGATIVE
HIV 1+2 AB+HIV1 P24 AG SERPL QL IA: NEGATIVE
RPR SER QL: NORMAL

## 2019-05-08 PROCEDURE — 77077 JOINT SURVEY SINGLE VIEW: CPT | Mod: 26,,, | Performed by: RADIOLOGY

## 2019-05-08 PROCEDURE — 77077 JOINT SURVEY SINGLE VIEW: CPT | Mod: TC,PO

## 2019-05-08 PROCEDURE — 77077 XR ARTHRITIS SURVEY: ICD-10-PCS | Mod: 26,,, | Performed by: RADIOLOGY

## 2019-05-09 LAB
STRONGYLOIDES ANTIBODY IGG: NEGATIVE
VARICELLA INTERPRETATION: POSITIVE
VARICELLA ZOSTER IGG: 3.46 ISR (ref 0–0.9)

## 2019-05-14 NOTE — TELEPHONE ENCOUNTER
DOCUMENTATION ONLY:     Prior Authorization for Humira Pen 40mg/0.8 ml approved from 5/12/19 to 5/31/19.    Case ID# [id number]    Co-Pay: $[place]    [facility]:   [phone] (Phone)  [phone] (Fax)     Humira Co-pay Coupon Card:   RxBIN: [id number]  RxPCN: [id number]  RxGRP: [id number]  RxID: [id number]  Suf: 01     AVA

## 2019-05-15 DIAGNOSIS — L40.50 PSA (PSORIATIC ARTHRITIS): Primary | ICD-10-CM

## 2019-05-16 ENCOUNTER — PATIENT MESSAGE (OUTPATIENT)
Dept: RHEUMATOLOGY | Facility: CLINIC | Age: 49
End: 2019-05-16

## 2019-05-16 PROBLEM — J32.9 CHRONIC INFECTION OF SINUS: Status: RESOLVED | Noted: 2019-03-01 | Resolved: 2019-05-16

## 2019-05-16 PROBLEM — J01.00 ACUTE MAXILLARY SINUSITIS: Status: RESOLVED | Noted: 2019-03-01 | Resolved: 2019-05-16

## 2019-05-20 ENCOUNTER — CLINICAL SUPPORT (OUTPATIENT)
Dept: INFECTIOUS DISEASES | Facility: CLINIC | Age: 49
End: 2019-05-20
Payer: COMMERCIAL

## 2019-05-20 ENCOUNTER — OFFICE VISIT (OUTPATIENT)
Dept: INFECTIOUS DISEASES | Facility: CLINIC | Age: 49
End: 2019-05-20
Payer: COMMERCIAL

## 2019-05-20 VITALS
HEART RATE: 101 BPM | HEIGHT: 70 IN | SYSTOLIC BLOOD PRESSURE: 132 MMHG | TEMPERATURE: 98 F | WEIGHT: 210.56 LBS | DIASTOLIC BLOOD PRESSURE: 80 MMHG | BODY MASS INDEX: 30.14 KG/M2

## 2019-05-20 DIAGNOSIS — Z78.9 NOT IMMUNE TO HEPATITIS B VIRUS: ICD-10-CM

## 2019-05-20 DIAGNOSIS — L40.50 PSORIATIC ARTHRITIS: Chronic | ICD-10-CM

## 2019-05-20 DIAGNOSIS — D84.9 IMMUNOSUPPRESSION: Primary | ICD-10-CM

## 2019-05-20 DIAGNOSIS — Z71.85 VACCINE COUNSELING: ICD-10-CM

## 2019-05-20 PROCEDURE — 3075F PR MOST RECENT SYSTOLIC BLOOD PRESS GE 130-139MM HG: ICD-10-PCS | Mod: CPTII,S$GLB,, | Performed by: INTERNAL MEDICINE

## 2019-05-20 PROCEDURE — 90739 HEPB VACC 2/4 DOSE ADULT IM: CPT | Mod: S$GLB,,, | Performed by: INTERNAL MEDICINE

## 2019-05-20 PROCEDURE — 3008F BODY MASS INDEX DOCD: CPT | Mod: CPTII,S$GLB,, | Performed by: INTERNAL MEDICINE

## 2019-05-20 PROCEDURE — 99999 PR PBB SHADOW E&M-EST. PATIENT-LVL IV: ICD-10-PCS | Mod: PBBFAC,,, | Performed by: INTERNAL MEDICINE

## 2019-05-20 PROCEDURE — 90670 PCV13 VACCINE IM: CPT | Mod: S$GLB,,, | Performed by: INTERNAL MEDICINE

## 2019-05-20 PROCEDURE — 3075F SYST BP GE 130 - 139MM HG: CPT | Mod: CPTII,S$GLB,, | Performed by: INTERNAL MEDICINE

## 2019-05-20 PROCEDURE — 90471 HEPATITIS B (RECOMBINANT) ADJUVANTED, 2 DOSE: ICD-10-PCS | Mod: S$GLB,,, | Performed by: INTERNAL MEDICINE

## 2019-05-20 PROCEDURE — 3079F PR MOST RECENT DIASTOLIC BLOOD PRESSURE 80-89 MM HG: ICD-10-PCS | Mod: CPTII,S$GLB,, | Performed by: INTERNAL MEDICINE

## 2019-05-20 PROCEDURE — 99999 PR PBB SHADOW E&M-EST. PATIENT-LVL IV: CPT | Mod: PBBFAC,,, | Performed by: INTERNAL MEDICINE

## 2019-05-20 PROCEDURE — 99204 PR OFFICE/OUTPT VISIT, NEW, LEVL IV, 45-59 MIN: ICD-10-PCS | Mod: 25,S$GLB,, | Performed by: INTERNAL MEDICINE

## 2019-05-20 PROCEDURE — 90632 HEPA VACCINE ADULT IM: CPT | Mod: S$GLB,,, | Performed by: INTERNAL MEDICINE

## 2019-05-20 PROCEDURE — 90750 ZOSTER RECOMBINANT VACCINE: ICD-10-PCS | Mod: S$GLB,,, | Performed by: INTERNAL MEDICINE

## 2019-05-20 PROCEDURE — 90670 PNEUMOCOCCAL CONJUGATE VACCINE 13-VALENT LESS THAN 5YO & GREATER THAN: ICD-10-PCS | Mod: S$GLB,,, | Performed by: INTERNAL MEDICINE

## 2019-05-20 PROCEDURE — 90750 HZV VACC RECOMBINANT IM: CPT | Mod: S$GLB,,, | Performed by: INTERNAL MEDICINE

## 2019-05-20 PROCEDURE — 90471 IMMUNIZATION ADMIN: CPT | Mod: S$GLB,,, | Performed by: INTERNAL MEDICINE

## 2019-05-20 PROCEDURE — 99204 OFFICE O/P NEW MOD 45 MIN: CPT | Mod: 25,S$GLB,, | Performed by: INTERNAL MEDICINE

## 2019-05-20 PROCEDURE — 90632 HEPATITIS A VACCINE ADULT IM: ICD-10-PCS | Mod: S$GLB,,, | Performed by: INTERNAL MEDICINE

## 2019-05-20 PROCEDURE — 90472 IMMUNIZATION ADMIN EACH ADD: CPT | Mod: S$GLB,,, | Performed by: INTERNAL MEDICINE

## 2019-05-20 PROCEDURE — 3079F DIAST BP 80-89 MM HG: CPT | Mod: CPTII,S$GLB,, | Performed by: INTERNAL MEDICINE

## 2019-05-20 PROCEDURE — 90472 PNEUMOCOCCAL CONJUGATE VACCINE 13-VALENT LESS THAN 5YO & GREATER THAN: ICD-10-PCS | Mod: S$GLB,,, | Performed by: INTERNAL MEDICINE

## 2019-05-20 PROCEDURE — 3008F PR BODY MASS INDEX (BMI) DOCUMENTED: ICD-10-PCS | Mod: CPTII,S$GLB,, | Performed by: INTERNAL MEDICINE

## 2019-05-20 PROCEDURE — 90739 HEPATITIS B (RECOMBINANT) ADJUVANTED, 2 DOSE: ICD-10-PCS | Mod: S$GLB,,, | Performed by: INTERNAL MEDICINE

## 2019-05-20 NOTE — PROGRESS NOTES
Pre Biologic Response Modifier Therapy Consult  BMR Recipient Evaluation    Requesting Physician: Dr Noel    Reason for Visit: Vaccine counseling    History of Present Illness  48 y.o. female with advanced psoriasis  presents for vaccine counseling.  Currently on leflunomide, about to start Humira. Used to have leukopenia with tegretol, resolved with drug discontinuation.  Patient denies any recent fever, chills, or infective infective illnesses.    Review of Symptoms:  Constitutional: Denies fevers, chills, or weakness.  Cardiovascular: Denies chest pain, palpitations  Respiratory: Denies shortness of breath, cough, hemoptysis  GI: Denies nausea/vomitting, abd pain  : Denies dysuria, incontinence, or hematuria.  Musculoskeletal: Denies joint pain or myalgias.  Skin/breast: Denies rashes, lumps, lesions, or discharge.  Neurologic: Denies headache, dizziness, vertigo, or paresthesias.    Past Medical History:   Diagnosis Date    Abnormal Pap smear     LEEP    Arthritis     Endometriosis     History of chicken pox     History of shingles     Hyperlipidemia     Hypertension     IBS (irritable bowel syndrome)     Malabsorption     Migraine     Normal vaginal delivery 8/07/2004    x 1    Pelvic pain in female     PONV (postoperative nausea and vomiting)     Psoriasis     Psoriatic arthritis     Seizure disorder     Seizures     Grand Mal Tonic Clonic last 2004    Vitamin D deficiency        Past Surgical History:   Procedure Laterality Date    CHOLECYSTECTOMY      Laparoscopic    dx lap      EXCISION, NASAL TURBINATE, SUBMUCOSAL N/A 3/1/2019    [facility]    FESS, USING COMPUTER-ASSISTED NAVIGATION, Balloon Sinuplasty N/A 3/1/2019    [facility]    HYSTERECTOMY      LAPAROSCOPY, DIAGNOSTIC N/A 3/8/2012    [facility]    [facility]      PELVIC LAPAROSCOPY      POLYPECTOMY N/A 3/1/2019    [facility]    SALPINGO-OOPHORECTOMY Right 3/8/2012    [facility]    SALPINGOOPHORECTOMY      Left    SEPTOPLASTY, NOSE N/A 3/1/2019    [facility]       Family History   Problem Relation Age of Onset    Psoriasis Mother     Psoriasis Sister     Diabetes Mellitus Maternal Grandmother     Heart disease Maternal Grandmother     Diabetes Maternal Grandmother     Hypertension Maternal Grandmother     Muscular dystrophy Maternal Grandfather     Hyperlipidemia Maternal Grandfather     Heart disease Paternal Grandfather     Hyperlipidemia Father     Breast cancer Neg Hx        Social History     Socioeconomic History    Marital status:      Spouse name: Not on file    Number of children: Not on file    Years of education: Not on file    Highest education level: Not on file   Occupational History    Not on file   Social Needs    Financial resource strain: Not on file    Food insecurity:     Worry: Not on file     Inability: Not on file    Transportation needs:     Medical: Not on file     Non-medical: Not on file   Tobacco Use    Smoking status: Former Smoker     Last attempt to quit: 2003     Years since quittin.0    Smokeless tobacco: Never Used   Substance and Sexual Activity    Alcohol use: Yes     Comment: 2-3 glasses of wine per day    Drug use: No    Sexual activity: Yes     Partners: Male   Lifestyle    Physical activity:     Days per week: Not on file     Minutes per session: Not on file    Stress: Not on file   Relationships    Social connections:     Talks on phone: Not on file     Gets together: Not on file     Attends Islam service: Not on file     Active member of club or organization: Not on file     Attends meetings of clubs or organizations: Not on file     Relationship status: Not on file   Other Topics Concern    Not on file   Social History Narrative    Not on  "file       Review of patient's allergies indicates:   Allergen Reactions    Topamax [topiramate] Other (See Comments)     Numbness. Exacerbation of other symptoms       Medications:  Current Outpatient Medications on File Prior to Visit   Medication Sig Dispense Refill    albuterol (PROVENTIL HFA) 90 mcg/actuation inhaler INHALE 2 PUFFS BY MOUTH FOUR TIMES DAILY AS NEEDED FOR WHEEZING 6.7 g 6    atorvastatin (LIPITOR) 10 MG tablet TAKE 1 TABLET (10 MG TOTAL) BY MOUTH ONCE DAILY. 90 tablet 3    biotin 10,000 mcg Cap Take 1 capsule by mouth once daily.       budesonide 1 mg/2 mL NbSp 1 vial by Nasal route 2 (two) times daily as needed.       celecoxib (CELEBREX) 200 MG capsule TAKE 1 CAPSULE (200 MG TOTAL) BY MOUTH DAILY AS NEEDED. 90 capsule 1    clotrimazole-betamethasone 1-0.05% (LOTRISONE) cream Apply 1 g topically 2 (two) times daily as needed.       lamoTRIgine (LAMICTAL) 200 MG tablet Take 2 tablets (400 mg total) by mouth once daily. 180 tablet 3    leflunomide (ARAVA) 20 MG Tab TAKE 1 TABLET BY MOUTH EVERY DAY 30 tablet 1    losartan-hydrochlorothiazide 100-12.5 mg (HYZAAR) 100-12.5 mg Tab TAKE 1 TABLET BY MOUTH ONCE DAILY. 90 tablet 2    thiamine (VITAMIN B-1) 50 MG tablet Take 50 mg by mouth once daily.      VITAMIN D2 50,000 unit capsule TAKE 1 CAPSULE (50,000 UNITS TOTAL) BY MOUTH EVERY 7 DAYS. 12 capsule 3    adalimumab 40 mg/0.4 mL PnKt Inject 0.4 mLs (40 mg total) into the skin every 14 (fourteen) days. 2 pen 2    clobetasol (TEMOVATE) 0.05 % cream Apply 1 application topically 2 (two) times daily as needed. 16 g 2    [DISCONTINUED] nitrofurantoin (MACRODANTIN) 25 MG Cap        No current facility-administered medications on file prior to visit.        Objective:   /80 (BP Location: Left arm, Patient Position: Sitting)   Pulse 101   Temp 98.4 °F (36.9 °C) (Oral)   Ht 5' 9.6" (1.768 m)   Wt 95.5 kg (210 lb 8.6 oz)   LMP 07/08/2008   BMI 30.56 kg/m²   General: Afebrile, alert, " comfortable, no acute distress.   HEENT: BAKARI. EOMI, no scleral icterus.   Pulmonary: Non labored  Extremities: Moves all extremities x 4. No peripheral edema. 2+ pulses.  Skin: No jaundice, rashes, or visible lesions.   Neurological:  Alert and oriented x 4.      1) Do you have a history of:         YES NO   Diabetes   []        [x]     Autoimmune disease  []        [x]   Cancer              []        [x]   Surgical Removal of Spleen []        [x]       2) Have you had recurrent infections:             YES NO  Sinus infections  []        [x]   Lung infections  []        [x]              Urinary Tract Infections []        [x]                                              Intestinal Infections  []        [x]      Skin Infections   []        [x]       Musculoskeletal Infections    []        [x]   Reproductive Infections []        [x]   Periodontal Disease  []        [x]        3)Have you ever had: YES     NO       Chicken Pox   [x]         []          Shingles   [x]         []             Orolabial Herpes             []         [x]          Genital Herpes  []         [x]           Genital Warts   []         [x]             Cytomegalovirus  []         [x]          Melanie-Barr Virus  []         [x]              Hepatitis A   []         [x]          Hepatitis B   []         [x]          Hepatitis C   []         [x]            Syphilis   []         [x]          Gonorrhea   []         [x]         Chlamydia    []         [x]           Parasites / worms  []         [x]         Fungal Infections  []         [x]         Bloodstream Infections []         [x]             4) Tuberculosis             YES NO  Exposure to person with active TB?  []         [x]   H/o homeless?    []         [x]   H/o imprisonment?    []         [x]   Have you ever had a positive PPD?      []         [x]   If yes, what treatment did you receive:          5) Travel    What states have you lived in? [place], [place], [place]    What countries have  you visited for more than 2 weeks? [facility] x 12 years                                  YES     NO  Did you have any associated infections?   []         [x]     Are you planning to travel outside of [place]?    []          [x]     6) Animal Exposure                  YES NO  Do you have pets living in your house?   [x]         []   If yes, describe:  Cat and dog    Do you spend time or live on a farm?    [x]         [x]   If yes, which ones:     Do you have a fish tank?         []  [x]    Do you have a litter box?     [x]         []     Do you fish or hunt?      []         [x]   Do you clean or skin fish or animals?    []         [x]     Consume raw or undercooked meat, fish, shellfish?  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  Some outdoor activities. Lives on farm             YES     NO  Do you garden or otherwise work in the soil?   [x]         []   Do you hike, camp, or spend time in wooded areas?  [x]         []  Cabin in a forest     9) The patient's immunization history was reviewed.     Have you ever received:  YES NO DATES  Routine Childhood vaccines  [x]         []   in [facility]-- yellow fever?     Influenza vaccine   [x]         []  2018   Prevnar    []         [x]     Pneumovax    []         [x]     Tetanus-diptheria -pertussis  [x]         [] 2015    Hepatitis A vaccine series       []         [x]     Hepatitis B vaccine series         []         [x]     Meningitis vaccine   []         [x]     Zoster vaccine    []         [x]        Significant labs reviewed:  HepA Ab neg  HepBs Ab neg  HepBs Ag neg  HepBc Ab neg  HepC Ab neg    HIV neg  RPR neg  Quant gold neg    Pending labs:    HIV: No components found for: HIV 1/2 AG/AB  Hepatitis C IgG: No components found for: HEPATITIS C  Syphilis:   RPR   Date Value Ref Range Status   05/07/2019 Non-reactive Non-reactive Final       Hepatitis A IgG: No components  found for: HEPATITIS A IGG  Hepatitis Bc IgG: No components found for: HEPATITIS B CORE IGG  Hepatitis Bs IgG:  Quantiferon: No results found for: QUANTIFERON  Toxoplasmosis: No results found for: TOXOPLASMA  VZV IgG: No components found for: VARICELLA IGG    No components found for: SEDIMENTATION RATE  No components found for: C-REACTIVE PROTEIN      Microbiology x 7d:   Microbiology Results (last 7 days)     ** No results found for the last 168 hours. **          Immunization History   Administered Date(s) Administered    Influenza 08/30/2017, 09/26/2018    Influenza - Quadrivalent - PF 09/26/2018       Assessment:     Vaccine Counseling  Immunosupression    Plan:       Biologic Response Modifier Candidacy:   Based on available information, there are no identified significant barriers to BRMs from an infectious disease standpoint pending acceptable serologies.      No clinical data are available on the efficacy and safety of vaccinations during leflunomide treatment. Spoke with SUE Bland and based on drug mechanism, patient will likely still benefit from vaccines while on leflunomide. Live Vaccination is contraindicated in patients using leflunomide. Except for inactivated influenza vaccine, vaccination during immune suppression might be suboptimal. Patients vaccinated within a 14-day period before starting immunosuppressive therapy should be revaccination at least 3 months after immune suppressive therapy is discontinued.    Counseling:  - I discussed with the patient the risk for increased susceptibility to infections following BRM therapy including increased risk for infection.    - Specific guidance has been provided to the patient regarding the patients occupation, hobbies and activities to avoid future infectious complications including but not limited to avoiding undercooked meats and seafood, proper hygiene, and contact with animals.  - The patients has been counseled on the importance of  vaccinations including but not limited to a yearly flu vaccine.    Immunizations:  Based on the patients immunization history and serologies, immunizations were ordered:    Today:  - Hepatitis A #1  - Hepatitis B #1  - Prevnar (Pneumonia #1)  - Shingrix #1 (Shingles)    1 month:  - Hepatitis B #2    2 months:  - Pneumovax (Pneumonia #2)  - Shingrix #2 (Shingles)    6 months:  - Hepatitis A #2              The patient was encouraged to contact us about any problems that may develop after immunization and possible side effects were reviewed.     Uyen Collins MD  Infectious Disease

## 2019-05-20 NOTE — PATIENT INSTRUCTIONS
Today:  - Hepatitis A #1  - Hepatitis B #1  - Prevnar (Pneumonia #1)  - Shingrix #1 (Shingles)    1 month:  - Hepatitis B #2    2 months:  - Pneumovax (Pneumonia #2)  - Shingrix #2 (Shingles)    6 months:  - Hepatitis A #2

## 2019-05-20 NOTE — PROGRESS NOTES
Ms. Arguello received Prevnar 13, 1st Shingles, Hepatitis A & Heplisav   Tolerated well  Left unit in NAD

## 2019-05-20 NOTE — LETTER
May 20, 2019      Waqar Noel MD  [address]  [facility] [id number]           [facility] - Infectious Diseases  [address]  [facility] [address]  Phone: [phone]  Fax: [phone]          Patient: Mable Arguello   MR Number: [id number]   YOB: 1970   Date of Visit: 5/20/2019       Dear Dr. Waqar Noel:    Thank you for referring Mable Arguello to me for evaluation. Attached you will find relevant portions of my assessment and plan of care.    If you have questions, please do not hesitate to call me. I look forward to following Mable Arguello along with you.    Sincerely,    Uyen Collins MD    Enclosure  CC:  No Recipients    If you would like to receive this communication electronically, please contact [email or web address] or [phone] to request more information on [employer] access.    For providers and/or their staff who would like to refer a patient to [facility], please contact us through our one-stop-shop provider referral line, [facility], at [phone].    If you feel you have received this communication in error or would no longer like to receive these types of communications, please e-mail [email or web address]

## 2019-06-07 ENCOUNTER — PATIENT MESSAGE (OUTPATIENT)
Dept: INFECTIOUS DISEASES | Facility: CLINIC | Age: 49
End: 2019-06-07

## 2019-06-17 ENCOUNTER — PATIENT MESSAGE (OUTPATIENT)
Dept: INFECTIOUS DISEASES | Facility: CLINIC | Age: 49
End: 2019-06-17

## 2019-07-25 ENCOUNTER — TELEPHONE (OUTPATIENT)
Dept: RHEUMATOLOGY | Facility: CLINIC | Age: 49
End: 2019-07-25

## 2019-07-25 RX ORDER — LEFLUNOMIDE 20 MG/1
TABLET ORAL
Qty: 30 TABLET | Refills: 0 | Status: SHIPPED | OUTPATIENT
Start: 2019-07-25 | End: 2019-09-14 | Stop reason: SDUPTHER

## 2019-09-15 ENCOUNTER — TELEPHONE (OUTPATIENT)
Dept: RHEUMATOLOGY | Facility: CLINIC | Age: 49
End: 2019-09-15

## 2019-09-15 RX ORDER — LEFLUNOMIDE 20 MG/1
TABLET ORAL
Qty: 30 TABLET | Refills: 0 | Status: SHIPPED | OUTPATIENT
Start: 2019-09-15 | End: 2019-10-14 | Stop reason: SDUPTHER

## 2019-09-16 ENCOUNTER — TELEPHONE (OUTPATIENT)
Dept: RHEUMATOLOGY | Facility: CLINIC | Age: 49
End: 2019-09-16

## 2019-09-16 NOTE — TELEPHONE ENCOUNTER
Please schedule overdue standing labs so leflunomide can be refilled. Also needs f/u appt. Thanks TARA

## 2019-09-18 ENCOUNTER — PATIENT MESSAGE (OUTPATIENT)
Dept: RHEUMATOLOGY | Facility: CLINIC | Age: 49
End: 2019-09-18

## 2019-09-24 ENCOUNTER — LAB VISIT (OUTPATIENT)
Dept: LAB | Facility: HOSPITAL | Age: 49
End: 2019-09-24
Attending: INTERNAL MEDICINE
Payer: COMMERCIAL

## 2019-09-24 ENCOUNTER — TELEPHONE (OUTPATIENT)
Dept: RHEUMATOLOGY | Facility: CLINIC | Age: 49
End: 2019-09-24

## 2019-09-24 ENCOUNTER — PATIENT MESSAGE (OUTPATIENT)
Dept: INFECTIOUS DISEASES | Facility: CLINIC | Age: 49
End: 2019-09-24

## 2019-09-24 DIAGNOSIS — Z79.899 ENCOUNTER FOR MONITORING LEFLUNOMIDE THERAPY: ICD-10-CM

## 2019-09-24 DIAGNOSIS — E83.52 HYPERCALCEMIA: Primary | ICD-10-CM

## 2019-09-24 DIAGNOSIS — Z51.81 ENCOUNTER FOR MONITORING LEFLUNOMIDE THERAPY: ICD-10-CM

## 2019-09-24 LAB
ALBUMIN SERPL BCP-MCNC: 4.4 G/DL (ref 3.5–5.2)
ALP SERPL-CCNC: 118 U/L (ref 55–135)
ALT SERPL W/O P-5'-P-CCNC: 23 U/L (ref 10–44)
ANION GAP SERPL CALC-SCNC: 8 MMOL/L (ref 8–16)
AST SERPL-CCNC: 20 U/L (ref 10–40)
BASOPHILS # BLD AUTO: 0.05 K/UL (ref 0–0.2)
BASOPHILS NFR BLD: 0.9 % (ref 0–1.9)
BILIRUB SERPL-MCNC: 0.4 MG/DL (ref 0.1–1)
BUN SERPL-MCNC: 16 MG/DL (ref 6–20)
CALCIUM SERPL-MCNC: 10.8 MG/DL (ref 8.7–10.5)
CHLORIDE SERPL-SCNC: 104 MMOL/L (ref 95–110)
CO2 SERPL-SCNC: 30 MMOL/L (ref 23–29)
CREAT SERPL-MCNC: 0.7 MG/DL (ref 0.5–1.4)
CRP SERPL-MCNC: 3.9 MG/L (ref 0–8.2)
DIFFERENTIAL METHOD: NORMAL
EOSINOPHIL # BLD AUTO: 0.2 K/UL (ref 0–0.5)
EOSINOPHIL NFR BLD: 3.9 % (ref 0–8)
ERYTHROCYTE [DISTWIDTH] IN BLOOD BY AUTOMATED COUNT: 13.9 % (ref 11.5–14.5)
ERYTHROCYTE [SEDIMENTATION RATE] IN BLOOD BY WESTERGREN METHOD: 9 MM/HR (ref 0–20)
EST. GFR  (AFRICAN AMERICAN): >60 ML/MIN/1.73 M^2
EST. GFR  (NON AFRICAN AMERICAN): >60 ML/MIN/1.73 M^2
GLUCOSE SERPL-MCNC: 109 MG/DL (ref 70–110)
HCT VFR BLD AUTO: 44.4 % (ref 37–48.5)
HGB BLD-MCNC: 14.4 G/DL (ref 12–16)
IMM GRANULOCYTES # BLD AUTO: 0.02 K/UL (ref 0–0.04)
IMM GRANULOCYTES NFR BLD AUTO: 0.4 % (ref 0–0.5)
LYMPHOCYTES # BLD AUTO: 1.5 K/UL (ref 1–4.8)
LYMPHOCYTES NFR BLD: 28.2 % (ref 18–48)
MCH RBC QN AUTO: 28.6 PG (ref 27–31)
MCHC RBC AUTO-ENTMCNC: 32.4 G/DL (ref 32–36)
MCV RBC AUTO: 88 FL (ref 82–98)
MONOCYTES # BLD AUTO: 0.5 K/UL (ref 0.3–1)
MONOCYTES NFR BLD: 9 % (ref 4–15)
NEUTROPHILS # BLD AUTO: 3.1 K/UL (ref 1.8–7.7)
NEUTROPHILS NFR BLD: 57.6 % (ref 38–73)
NRBC BLD-RTO: 0 /100 WBC
PLATELET # BLD AUTO: 311 K/UL (ref 150–350)
PMV BLD AUTO: 10.6 FL (ref 9.2–12.9)
POTASSIUM SERPL-SCNC: 4.5 MMOL/L (ref 3.5–5.1)
PROT SERPL-MCNC: 8 G/DL (ref 6–8.4)
RBC # BLD AUTO: 5.04 M/UL (ref 4–5.4)
SODIUM SERPL-SCNC: 142 MMOL/L (ref 136–145)
WBC # BLD AUTO: 5.35 K/UL (ref 3.9–12.7)

## 2019-09-24 PROCEDURE — 36415 COLL VENOUS BLD VENIPUNCTURE: CPT | Mod: PO

## 2019-09-24 PROCEDURE — 85025 COMPLETE CBC W/AUTO DIFF WBC: CPT

## 2019-09-24 PROCEDURE — 85651 RBC SED RATE NONAUTOMATED: CPT | Mod: PO

## 2019-09-24 PROCEDURE — 80053 COMPREHEN METABOLIC PANEL: CPT

## 2019-09-24 PROCEDURE — 86140 C-REACTIVE PROTEIN: CPT

## 2019-09-25 ENCOUNTER — PATIENT MESSAGE (OUTPATIENT)
Dept: RHEUMATOLOGY | Facility: CLINIC | Age: 49
End: 2019-09-25

## 2019-09-26 ENCOUNTER — CLINICAL SUPPORT (OUTPATIENT)
Dept: INFECTIOUS DISEASES | Facility: CLINIC | Age: 49
End: 2019-09-26
Payer: COMMERCIAL

## 2019-09-26 DIAGNOSIS — Z78.9 NOT IMMUNE TO HEPATITIS B VIRUS: ICD-10-CM

## 2019-09-26 PROCEDURE — 90732 PNEUMOCOCCAL POLYSACCHARIDE VACCINE 23-VALENT =>2YO SQ IM: ICD-10-PCS | Mod: S$GLB,,, | Performed by: INTERNAL MEDICINE

## 2019-09-26 PROCEDURE — 90732 PPSV23 VACC 2 YRS+ SUBQ/IM: CPT | Mod: S$GLB,,, | Performed by: INTERNAL MEDICINE

## 2019-09-26 PROCEDURE — 90472 PNEUMOCOCCAL POLYSACCHARIDE VACCINE 23-VALENT =>2YO SQ IM: ICD-10-PCS | Mod: S$GLB,,, | Performed by: INTERNAL MEDICINE

## 2019-09-26 PROCEDURE — 90471 HEPATITIS B (RECOMBINANT) ADJUVANTED, 2 DOSE: ICD-10-PCS | Mod: S$GLB,,, | Performed by: INTERNAL MEDICINE

## 2019-09-26 PROCEDURE — 90471 IMMUNIZATION ADMIN: CPT | Mod: S$GLB,,, | Performed by: INTERNAL MEDICINE

## 2019-09-26 PROCEDURE — 90750 HZV VACC RECOMBINANT IM: CPT | Mod: S$GLB,,, | Performed by: INTERNAL MEDICINE

## 2019-09-26 PROCEDURE — 90750 ZOSTER RECOMBINANT VACCINE: ICD-10-PCS | Mod: S$GLB,,, | Performed by: INTERNAL MEDICINE

## 2019-09-26 PROCEDURE — 90739 HEPB VACC 2/4 DOSE ADULT IM: CPT | Mod: S$GLB,,, | Performed by: INTERNAL MEDICINE

## 2019-09-26 PROCEDURE — 90739 HEPATITIS B (RECOMBINANT) ADJUVANTED, 2 DOSE: ICD-10-PCS | Mod: S$GLB,,, | Performed by: INTERNAL MEDICINE

## 2019-09-26 PROCEDURE — 90472 IMMUNIZATION ADMIN EACH ADD: CPT | Mod: S$GLB,,, | Performed by: INTERNAL MEDICINE

## 2019-09-26 NOTE — PROGRESS NOTES
received Heplisav-B(second dose), Pnemovax, and Shingrix(second dose) vaccines. Tolerated well. Left unit in NAD.

## 2019-10-09 ENCOUNTER — LAB VISIT (OUTPATIENT)
Dept: LAB | Facility: HOSPITAL | Age: 49
End: 2019-10-09
Attending: INTERNAL MEDICINE
Payer: COMMERCIAL

## 2019-10-09 DIAGNOSIS — E83.52 HYPERCALCEMIA: ICD-10-CM

## 2019-10-09 LAB
25(OH)D3+25(OH)D2 SERPL-MCNC: 39 NG/ML (ref 30–96)
CA-I BLDV-SCNC: 1.04 MMOL/L (ref 1.06–1.42)
CALCIUM SERPL-MCNC: 10.1 MG/DL (ref 8.7–10.5)
MAGNESIUM SERPL-MCNC: 2.2 MG/DL (ref 1.6–2.6)
PHOSPHATE SERPL-MCNC: 3.6 MG/DL (ref 2.7–4.5)
PTH-INTACT SERPL-MCNC: 36 PG/ML (ref 9–77)

## 2019-10-09 PROCEDURE — 36415 COLL VENOUS BLD VENIPUNCTURE: CPT | Mod: PO

## 2019-10-09 PROCEDURE — 82306 VITAMIN D 25 HYDROXY: CPT

## 2019-10-09 PROCEDURE — 83735 ASSAY OF MAGNESIUM: CPT

## 2019-10-09 PROCEDURE — 84100 ASSAY OF PHOSPHORUS: CPT

## 2019-10-09 PROCEDURE — 83970 ASSAY OF PARATHORMONE: CPT

## 2019-10-09 PROCEDURE — 82310 ASSAY OF CALCIUM: CPT

## 2019-10-09 PROCEDURE — 82330 ASSAY OF CALCIUM: CPT

## 2019-10-14 RX ORDER — LEFLUNOMIDE 20 MG/1
TABLET ORAL
Qty: 90 TABLET | Refills: 0 | Status: SHIPPED | OUTPATIENT
Start: 2019-10-14 | End: 2019-11-15 | Stop reason: SDUPTHER

## 2019-10-21 DIAGNOSIS — L40.50 PSORIATIC ARTHRITIS: Chronic | ICD-10-CM

## 2019-10-21 RX ORDER — CELECOXIB 200 MG/1
200 CAPSULE ORAL DAILY PRN
Qty: 90 CAPSULE | Refills: 1 | Status: SHIPPED | OUTPATIENT
Start: 2019-10-21 | End: 2020-04-16

## 2019-11-06 ENCOUNTER — OFFICE VISIT (OUTPATIENT)
Dept: RHEUMATOLOGY | Facility: CLINIC | Age: 49
End: 2019-11-06
Payer: COMMERCIAL

## 2019-11-06 VITALS
BODY MASS INDEX: 30.94 KG/M2 | HEIGHT: 70 IN | DIASTOLIC BLOOD PRESSURE: 93 MMHG | WEIGHT: 216.13 LBS | HEART RATE: 101 BPM | SYSTOLIC BLOOD PRESSURE: 141 MMHG

## 2019-11-06 DIAGNOSIS — I10 ESSENTIAL HYPERTENSION: ICD-10-CM

## 2019-11-06 DIAGNOSIS — L60.1 ONYCHOLYSIS OF TOENAIL: ICD-10-CM

## 2019-11-06 DIAGNOSIS — L40.50 PSORIATIC ARTHRITIS: Chronic | ICD-10-CM

## 2019-11-06 DIAGNOSIS — E78.49 OTHER HYPERLIPIDEMIA: ICD-10-CM

## 2019-11-06 DIAGNOSIS — L40.9 PSORIASIS: Primary | ICD-10-CM

## 2019-11-06 PROCEDURE — 3008F BODY MASS INDEX DOCD: CPT | Mod: CPTII,S$GLB,, | Performed by: INTERNAL MEDICINE

## 2019-11-06 PROCEDURE — 3077F PR MOST RECENT SYSTOLIC BLOOD PRESSURE >= 140 MM HG: ICD-10-PCS | Mod: CPTII,S$GLB,, | Performed by: INTERNAL MEDICINE

## 2019-11-06 PROCEDURE — 3080F DIAST BP >= 90 MM HG: CPT | Mod: CPTII,S$GLB,, | Performed by: INTERNAL MEDICINE

## 2019-11-06 PROCEDURE — 99214 OFFICE O/P EST MOD 30 MIN: CPT | Mod: S$GLB,,, | Performed by: INTERNAL MEDICINE

## 2019-11-06 PROCEDURE — 3080F PR MOST RECENT DIASTOLIC BLOOD PRESSURE >= 90 MM HG: ICD-10-PCS | Mod: CPTII,S$GLB,, | Performed by: INTERNAL MEDICINE

## 2019-11-06 PROCEDURE — 99999 PR PBB SHADOW E&M-EST. PATIENT-LVL III: CPT | Mod: PBBFAC,,, | Performed by: INTERNAL MEDICINE

## 2019-11-06 PROCEDURE — 3008F PR BODY MASS INDEX (BMI) DOCUMENTED: ICD-10-PCS | Mod: CPTII,S$GLB,, | Performed by: INTERNAL MEDICINE

## 2019-11-06 PROCEDURE — 99999 PR PBB SHADOW E&M-EST. PATIENT-LVL III: ICD-10-PCS | Mod: PBBFAC,,, | Performed by: INTERNAL MEDICINE

## 2019-11-06 PROCEDURE — 99214 PR OFFICE/OUTPT VISIT, EST, LEVL IV, 30-39 MIN: ICD-10-PCS | Mod: S$GLB,,, | Performed by: INTERNAL MEDICINE

## 2019-11-06 PROCEDURE — 3077F SYST BP >= 140 MM HG: CPT | Mod: CPTII,S$GLB,, | Performed by: INTERNAL MEDICINE

## 2019-11-06 RX ORDER — CLOBETASOL PROPIONATE 0.5 MG/G
1 CREAM TOPICAL 2 TIMES DAILY PRN
Qty: 15 G | Refills: 1 | Status: SHIPPED | OUTPATIENT
Start: 2019-11-06 | End: 2019-11-16

## 2019-11-06 ASSESSMENT — ROUTINE ASSESSMENT OF PATIENT INDEX DATA (RAPID3)
PSYCHOLOGICAL DISTRESS SCORE: 0
PATIENT GLOBAL ASSESSMENT SCORE: 0
MDHAQ FUNCTION SCORE: .5
FATIGUE SCORE: 0
PAIN SCORE: 2
TOTAL RAPID3 SCORE: 1.22
AM STIFFNESS SCORE: 0, NO

## 2019-11-06 NOTE — PROGRESS NOTES
I have personally taken the history and examined the patient and agree with the resident,s note as stated above           PsA TJ 0  SJ 0  Pt global 0  ESR 9 CRP 3.9  DAS28  [id number]   DAPSA  TJ 0  SJ 0 Pt global 0 Pt pain 2  CRP 0.39 = 4.04(remission-  LDA) she feels no need to add adalimumab at present  Psoriasis; severe plantar psoriasis(has not been applying clobetasol) and severe bilateral onycholysis toenails. Minimal thin plaque right elbow  Hypertension 141/93 on Hyzaar 100-12.5mg daily  Hyperlipidemia .4  on atorvastatin 10mg daily     lipid panel  F/u Dr. Montoya for hypertension, hyperlipidemia she has upcoming appt.  leflunomide 20mg daily  Celecoxib 200mg daily prn  Clobetasol twice daily prn  RTC 3 months with standing labs if still here. She is selling house and moving to [place] as soon as that occurs.

## 2019-11-06 NOTE — PROGRESS NOTES
Subjective:       Patient ID: Mable Arguello is a 48 y.o. female.    Chief Complaint: No chief complaint on file.    Ms. Mable Arguello is a 48 y.o. female with PMH of HTN, HLD and psoriatic arthritis came in today f/u after starting Humira in May 2019.     She has been very busy this summer doing a lot of yard work and has had no worsening of symptoms. She recently traveled to [place] and broke out in a rash around her neck that was itchy but is improving. Since her sinus surgery in March she has started to have allergies for the first time in her life. She is planning on moving.    She denies any morning stiffness. She denies any swelling and states no other joint pain. She denies weight changes, night sweats or fatigue. She does not wish to start Humira and had not started it due to getting all of her vaccinations. She feels like her current meds are working and she doesn't want to change anything.    Her recent labs indicate normal inflammatory markers including ESR 9 and CRP 3.9.     She also mentions giving the fact she is taking AED, she had to prioritize medications, including HTN meds. Her BP today 162/95. Patient is asymptomatic with no chest pain, SOB, headache, lightheadedness or palpitation. She has a F/U with her PCP in 10 days.    Review of Systems   Constitutional: Negative for appetite change, fatigue, fever and unexpected weight change.   HENT: Negative for congestion and nosebleeds.    Respiratory: Negative for cough, chest tightness and shortness of breath.    Cardiovascular: Negative for chest pain, palpitations and leg swelling.   Gastrointestinal: Negative for abdominal distention and abdominal pain.   Genitourinary: Negative for difficulty urinating.   Musculoskeletal: Negative for arthralgias, back pain, joint swelling and myalgias.   Skin: Positive for rash (neck see HPI).         Past Medical History:   Diagnosis Date    Abnormal Pap smear     LEEP    Arthritis      Endometriosis     History of chicken pox     History of shingles     Hyperlipidemia     Hypertension     IBS (irritable bowel syndrome)     Malabsorption     Migraine     Normal vaginal delivery 8/07/2004    x 1    Pelvic pain in female     PONV (postoperative nausea and vomiting)     Psoriasis     Psoriatic arthritis     Seizure disorder     Seizures     [place] Tonic Clonic last 2004    Vitamin D deficiency      Current Outpatient Medications   Medication Sig    adalimumab 40 mg/0.4 mL PnKt Inject 0.4 mLs (40 mg total) into the skin every 14 (fourteen) days.    albuterol (PROVENTIL HFA) 90 mcg/actuation inhaler INHALE 2 PUFFS BY MOUTH FOUR TIMES DAILY AS NEEDED FOR WHEEZING    atorvastatin (LIPITOR) 10 MG tablet TAKE 1 TABLET (10 MG TOTAL) BY MOUTH ONCE DAILY.    biotin 10,000 mcg Cap Take 1 capsule by mouth once daily.     budesonide 1 mg/2 mL NbSp 1 vial by Nasal route 2 (two) times daily as needed.     celecoxib (CELEBREX) 200 MG capsule TAKE 1 CAPSULE (200 MG TOTAL) BY MOUTH DAILY AS NEEDED.    clobetasol (TEMOVATE) 0.05 % cream Apply 1 application topically 2 (two) times daily as needed.    clotrimazole-betamethasone 1-0.05% (LOTRISONE) cream Apply 1 g topically 2 (two) times daily as needed.     lamoTRIgine (LAMICTAL) 200 MG tablet Take 2 tablets (400 mg total) by mouth once daily.    leflunomide (ARAVA) 20 MG Tab TAKE 1 TABLET BY MOUTH EVERY DAY    losartan-hydrochlorothiazide 100-12.5 mg (HYZAAR) 100-12.5 mg Tab TAKE 1 TABLET BY MOUTH ONCE DAILY.    thiamine (VITAMIN B-1) 50 MG tablet Take 50 mg by mouth once daily.    VITAMIN D2 50,000 unit capsule TAKE 1 CAPSULE (50,000 UNITS TOTAL) BY MOUTH EVERY 7 DAYS.     No current facility-administered medications for this visit.      Objective:   LMP 07/08/2008      Physical Exam   Constitutional: She is well-developed, well-nourished, and in no distress. No distress.   HENT:   Head: Normocephalic and atraumatic.   Eyes: EOM are  normal. Pupils are equal, round, and reactive to light. Right eye exhibits no discharge. Left eye exhibits no discharge. No scleral icterus.   Neck: Normal range of motion. Neck supple.   Cardiovascular: Normal rate, regular rhythm and normal heart sounds.  Exam reveals no gallop and no friction rub.    No murmur heard.  Pulmonary/Chest: Effort normal and breath sounds normal. No respiratory distress. She has no wheezes. She has no rales. She exhibits no tenderness.       Right Side Rheumatological Exam     Examination finds the shoulder, elbow, wrist, knee, 1st PIP, 1st MCP, 2nd PIP, 2nd MCP, 3rd PIP, 3rd MCP, 4th PIP, 4th MCP, 5th PIP and 5th MCP normal.    Muscle Strength (0-5 scale):  Deltoid:  5  Biceps: 5/5   Triceps:  5  : 5/5   Iliopsoas: 5  Quadriceps:  5   Distal Lower Extremity: 5    Left Side Rheumatological Exam     Examination finds the shoulder, elbow, wrist, knee, 1st PIP, 1st MCP, 2nd PIP, 2nd MCP, 3rd PIP, 3rd MCP, 4th PIP, 4th MCP, 5th PIP and 5th MCP normal.    Muscle Strength (0-5 scale):  Deltoid:  5  Biceps: 5/5   Triceps:  5  :  5/5   Quadriceps:  5   Distal Lower Extremity: 5      Skin:     Dry, cracked and scaling skin on the bottoms of both feet. Onychomycosis of toenails.    Right Elbow is erythematous with some dry scaling.    Erythematous macular/papular rash on left, right and front neck.   Musculoskeletal: Normal range of motion.          No data to display     Component      Latest Ref Rng & Units 10/9/2019   Vit D, 25-Hydroxy      30 - 96 ng/mL 39   Calcium      8.7 - 10.5 mg/dL 10.1   Calcium, Ion      1.06 - 1.42 mmol/L 1.04 (L)   Magnesium      1.6 - 2.6 mg/dL 2.2   Phosphorus      2.7 - 4.5 mg/dL 3.6   PTH      9.0 - 77.0 pg/mL 36.0     Component      Latest Ref Rng & Units 9/24/2019 5/7/2019   WBC      3.90 - 12.70 K/uL 5.35    RBC      4.00 - 5.40 M/uL 5.04    Hemoglobin      12.0 - 16.0 g/dL 14.4    Hematocrit      37.0 - 48.5 % 44.4    MCV      82 - 98 fL 88    MCH       27.0 - 31.0 pg 28.6    MCHC      32.0 - 36.0 g/dL 32.4    RDW      11.5 - 14.5 % 13.9    Platelets      150 - 350 K/uL 311    MPV      9.2 - 12.9 fL 10.6    Immature Granulocytes      0.0 - 0.5 % 0.4    Gran # (ANC)      1.8 - 7.7 K/uL 3.1    Immature Grans (Abs)      0.00 - 0.04 K/uL 0.02    Lymph #      1.0 - 4.8 K/uL 1.5    Mono #      0.3 - 1.0 K/uL 0.5    Eos #      0.0 - 0.5 K/uL 0.2    Baso #      0.00 - 0.20 K/uL 0.05    nRBC      0 /100 WBC 0    Gran%      38.0 - 73.0 % 57.6    Lymph%      18.0 - 48.0 % 28.2    Mono%      4.0 - 15.0 % 9.0    Eosinophil%      0.0 - 8.0 % 3.9    Basophil%      0.0 - 1.9 % 0.9    Differential Method       Automated    Sodium      136 - 145 mmol/L 142    Potassium      3.5 - 5.1 mmol/L 4.5    Chloride      95 - 110 mmol/L 104    CO2      23 - 29 mmol/L 30 (H)    Glucose      70 - 110 mg/dL 109    BUN, Bld      6 - 20 mg/dL 16    Creatinine      0.5 - 1.4 mg/dL 0.7    Calcium      8.7 - 10.5 mg/dL 10.8 (H)    PROTEIN TOTAL      6.0 - 8.4 g/dL 8.0    Albumin      3.5 - 5.2 g/dL 4.4    BILIRUBIN TOTAL      0.1 - 1.0 mg/dL 0.4    Alkaline Phosphatase      55 - 135 U/L 118    AST      10 - 40 U/L 20    ALT      10 - 44 U/L 23    Anion Gap      8 - 16 mmol/L 8    eGFR if African American      >60 mL/min/1.73 m:2 >60.0    eGFR if non African American      >60 mL/min/1.73 m:2 >60.0    Cholesterol      120 - 199 mg/dL  240 (H)   Triglycerides      30 - 150 mg/dL  98   HDL      40 - 75 mg/dL  96 (H)   LDL Cholesterol External      63.0 - 159.0 mg/dL  124.4   Hdl/Cholesterol Ratio      20.0 - 50.0 %  40.0   Total Cholesterol/HDL Ratio      2.0 - 5.0  2.5   Non-HDL Cholesterol      mg/dL  144   NIL      See text IU/mL  0.060   TB1 - Nil      See text IU/mL  -0.020   TB2 - Nil      See text IU/mL  -0.010   Mitogen - Nil      See text IU/mL  >10.000   TB Gold Plus        Negative   Varicella IgG      0.00 - 0.90 ISR  3.46 (H)   Varicella Interpretation      Negative  Positive (A)   Vit D,  25-Hydroxy      30 - 96 ng/mL  31   HIV 1/2 Ag/Ab      Negative  Negative   Hepatitis B Surface Ag        Negative   Hep B Core Total Ab        Negative   Hep B S Ab        Negative   Hepatitis C Ab        Negative   Hepatitis A Antibody IgG        Negative   Strongyloides Ab IgG      Negative  Negative   RPR      Non-reactive  Non-reactive   Sed Rate      0 - 20 mm/Hr 9    CRP      0.0 - 8.2 mg/L 3.9      Assessment:       1. Psoriasis    2. Psoriatic arthritis    3. Onycholysis of toenail    4. Essential hypertension    5. Other hyperlipidemia        Psoriasis; plantar psoriasis bilateral onycholysis toenails  Hypertension 141/93  Hyperlipidemia on atorvastatin 10mg daily  Hypertension on Hyzaar 100-12.5mg daily    PsA TJ 0  SJ 0  Pt global 0  ESR 9 CRP 3.9  DAS28  [id number]   DAPSA  TJ 0  SJ 0 Pt global 0 Pt pain 2  CRP 0.39 = 4.04(remission-  LDA) she feels no need to add adalimumab at present    Plan:       Problem List Items Addressed This Visit        Derm    Psoriasis - Primary    Onycholysis of toenail       Cardiac/Vascular    Hypertension    Hyperlipidemia       Orthopedic    Psoriatic arthritis (Chronic)        1. Psoriasis/Psoriatic Arthritis   Immunization up to date  Continue leflunomide 20mg daily  Celecoxib 200mg daily prn  Clobetasol twice daily prn    See primary for hypertension and HLD  RTC 3 months with standing labs    Pt was seen and discussed with Dr. Noel who is in agreement with the plan.  Payton Michelle MD  [employer] [facility] PGY1

## 2019-11-15 RX ORDER — LEFLUNOMIDE 20 MG/1
TABLET ORAL
Qty: 90 TABLET | Refills: 0 | Status: SHIPPED | OUTPATIENT
Start: 2019-11-15 | End: 2020-05-05 | Stop reason: SDUPTHER

## 2019-12-02 ENCOUNTER — LAB VISIT (OUTPATIENT)
Dept: LAB | Facility: HOSPITAL | Age: 49
End: 2019-12-02
Attending: INTERNAL MEDICINE
Payer: COMMERCIAL

## 2019-12-02 DIAGNOSIS — E78.49 OTHER HYPERLIPIDEMIA: ICD-10-CM

## 2019-12-02 LAB
CHOLEST SERPL-MCNC: 240 MG/DL (ref 120–199)
CHOLEST/HDLC SERPL: 2.5 {RATIO} (ref 2–5)
HDLC SERPL-MCNC: 96 MG/DL (ref 40–75)
HDLC SERPL: 40 % (ref 20–50)
LDLC SERPL CALC-MCNC: 130 MG/DL (ref 63–159)
NONHDLC SERPL-MCNC: 144 MG/DL
TRIGL SERPL-MCNC: 70 MG/DL (ref 30–150)

## 2019-12-02 PROCEDURE — 36415 COLL VENOUS BLD VENIPUNCTURE: CPT | Mod: PO

## 2019-12-02 PROCEDURE — 80061 LIPID PANEL: CPT

## 2020-01-28 ENCOUNTER — PATIENT MESSAGE (OUTPATIENT)
Dept: RHEUMATOLOGY | Facility: CLINIC | Age: 50
End: 2020-01-28

## 2020-02-06 ENCOUNTER — PATIENT MESSAGE (OUTPATIENT)
Dept: NEUROLOGY | Facility: CLINIC | Age: 50
End: 2020-02-06

## 2020-04-16 ENCOUNTER — TELEPHONE (OUTPATIENT)
Dept: RHEUMATOLOGY | Facility: CLINIC | Age: 50
End: 2020-04-16

## 2020-04-16 DIAGNOSIS — L40.50 PSORIATIC ARTHRITIS: Chronic | ICD-10-CM

## 2020-04-16 RX ORDER — CELECOXIB 200 MG/1
200 CAPSULE ORAL DAILY PRN
Qty: 90 CAPSULE | Refills: 0 | Status: SHIPPED | OUTPATIENT
Start: 2020-04-16 | End: 2022-02-08

## 2020-04-17 ENCOUNTER — LAB VISIT (OUTPATIENT)
Dept: LAB | Facility: HOSPITAL | Age: 50
End: 2020-04-17
Attending: INTERNAL MEDICINE
Payer: COMMERCIAL

## 2020-04-17 DIAGNOSIS — Z79.899 ENCOUNTER FOR MONITORING LEFLUNOMIDE THERAPY: ICD-10-CM

## 2020-04-17 DIAGNOSIS — Z51.81 ENCOUNTER FOR MONITORING LEFLUNOMIDE THERAPY: ICD-10-CM

## 2020-04-17 LAB
ALBUMIN SERPL BCP-MCNC: 3.8 G/DL (ref 3.5–5.2)
ALP SERPL-CCNC: 109 U/L (ref 55–135)
ALT SERPL W/O P-5'-P-CCNC: 18 U/L (ref 10–44)
ANION GAP SERPL CALC-SCNC: 9 MMOL/L (ref 8–16)
AST SERPL-CCNC: 16 U/L (ref 10–40)
BASOPHILS # BLD AUTO: 0.05 K/UL (ref 0–0.2)
BASOPHILS NFR BLD: 0.8 % (ref 0–1.9)
BILIRUB SERPL-MCNC: 0.4 MG/DL (ref 0.1–1)
BUN SERPL-MCNC: 8 MG/DL (ref 6–20)
CALCIUM SERPL-MCNC: 9.8 MG/DL (ref 8.7–10.5)
CHLORIDE SERPL-SCNC: 101 MMOL/L (ref 95–110)
CO2 SERPL-SCNC: 27 MMOL/L (ref 23–29)
CREAT SERPL-MCNC: 0.7 MG/DL (ref 0.5–1.4)
CRP SERPL-MCNC: 6.2 MG/L (ref 0–8.2)
DIFFERENTIAL METHOD: ABNORMAL
EOSINOPHIL # BLD AUTO: 0.3 K/UL (ref 0–0.5)
EOSINOPHIL NFR BLD: 4.9 % (ref 0–8)
ERYTHROCYTE [DISTWIDTH] IN BLOOD BY AUTOMATED COUNT: 12.8 % (ref 11.5–14.5)
ERYTHROCYTE [SEDIMENTATION RATE] IN BLOOD BY WESTERGREN METHOD: 12 MM/HR (ref 0–20)
EST. GFR  (AFRICAN AMERICAN): >60 ML/MIN/1.73 M^2
EST. GFR  (NON AFRICAN AMERICAN): >60 ML/MIN/1.73 M^2
GLUCOSE SERPL-MCNC: 108 MG/DL (ref 70–110)
HCT VFR BLD AUTO: 43.7 % (ref 37–48.5)
HGB BLD-MCNC: 13.3 G/DL (ref 12–16)
IMM GRANULOCYTES # BLD AUTO: 0.01 K/UL (ref 0–0.04)
IMM GRANULOCYTES NFR BLD AUTO: 0.2 % (ref 0–0.5)
LYMPHOCYTES # BLD AUTO: 1.5 K/UL (ref 1–4.8)
LYMPHOCYTES NFR BLD: 25.6 % (ref 18–48)
MCH RBC QN AUTO: 28.2 PG (ref 27–31)
MCHC RBC AUTO-ENTMCNC: 30.4 G/DL (ref 32–36)
MCV RBC AUTO: 93 FL (ref 82–98)
MONOCYTES # BLD AUTO: 0.4 K/UL (ref 0.3–1)
MONOCYTES NFR BLD: 7.5 % (ref 4–15)
NEUTROPHILS # BLD AUTO: 3.6 K/UL (ref 1.8–7.7)
NEUTROPHILS NFR BLD: 61 % (ref 38–73)
NRBC BLD-RTO: 0 /100 WBC
PLATELET # BLD AUTO: 342 K/UL (ref 150–350)
PMV BLD AUTO: 10.8 FL (ref 9.2–12.9)
POTASSIUM SERPL-SCNC: 4.2 MMOL/L (ref 3.5–5.1)
PROT SERPL-MCNC: 7.4 G/DL (ref 6–8.4)
RBC # BLD AUTO: 4.71 M/UL (ref 4–5.4)
SODIUM SERPL-SCNC: 137 MMOL/L (ref 136–145)
WBC # BLD AUTO: 5.9 K/UL (ref 3.9–12.7)

## 2020-04-17 PROCEDURE — 36415 COLL VENOUS BLD VENIPUNCTURE: CPT | Mod: PO

## 2020-04-17 PROCEDURE — 85651 RBC SED RATE NONAUTOMATED: CPT | Mod: PO

## 2020-04-17 PROCEDURE — 80053 COMPREHEN METABOLIC PANEL: CPT

## 2020-04-17 PROCEDURE — 85025 COMPLETE CBC W/AUTO DIFF WBC: CPT

## 2020-04-17 PROCEDURE — 86140 C-REACTIVE PROTEIN: CPT

## 2020-04-18 ENCOUNTER — PATIENT MESSAGE (OUTPATIENT)
Dept: RHEUMATOLOGY | Facility: CLINIC | Age: 50
End: 2020-04-18

## 2020-04-28 RX ORDER — LAMOTRIGINE 200 MG/1
400 TABLET ORAL DAILY
Qty: 180 TABLET | Refills: 1 | Status: SHIPPED | OUTPATIENT
Start: 2020-04-28 | End: 2020-08-11 | Stop reason: SDUPTHER

## 2020-05-02 ENCOUNTER — PATIENT MESSAGE (OUTPATIENT)
Dept: NEUROLOGY | Facility: CLINIC | Age: 50
End: 2020-05-02

## 2020-05-04 ENCOUNTER — TELEPHONE (OUTPATIENT)
Dept: RHEUMATOLOGY | Facility: CLINIC | Age: 50
End: 2020-05-04

## 2020-05-04 ENCOUNTER — PATIENT MESSAGE (OUTPATIENT)
Dept: RHEUMATOLOGY | Facility: CLINIC | Age: 50
End: 2020-05-04

## 2020-05-05 ENCOUNTER — OFFICE VISIT (OUTPATIENT)
Dept: RHEUMATOLOGY | Facility: CLINIC | Age: 50
End: 2020-05-05
Payer: COMMERCIAL

## 2020-05-05 ENCOUNTER — TELEPHONE (OUTPATIENT)
Dept: RHEUMATOLOGY | Facility: CLINIC | Age: 50
End: 2020-05-05

## 2020-05-05 VITALS
HEART RATE: 69 BPM | BODY MASS INDEX: 30.04 KG/M2 | HEIGHT: 70 IN | WEIGHT: 209.81 LBS | SYSTOLIC BLOOD PRESSURE: 126 MMHG | DIASTOLIC BLOOD PRESSURE: 83 MMHG

## 2020-05-05 DIAGNOSIS — L40.9 PSORIASIS: Primary | ICD-10-CM

## 2020-05-05 DIAGNOSIS — E55.9 VITAMIN D DEFICIENCY: ICD-10-CM

## 2020-05-05 DIAGNOSIS — L40.50 PSORIATIC ARTHRITIS: ICD-10-CM

## 2020-05-05 PROCEDURE — 3008F PR BODY MASS INDEX (BMI) DOCUMENTED: ICD-10-PCS | Mod: CPTII,S$GLB,, | Performed by: INTERNAL MEDICINE

## 2020-05-05 PROCEDURE — 3008F BODY MASS INDEX DOCD: CPT | Mod: CPTII,S$GLB,, | Performed by: INTERNAL MEDICINE

## 2020-05-05 PROCEDURE — 99214 PR OFFICE/OUTPT VISIT, EST, LEVL IV, 30-39 MIN: ICD-10-PCS | Mod: S$GLB,,, | Performed by: INTERNAL MEDICINE

## 2020-05-05 PROCEDURE — 3074F SYST BP LT 130 MM HG: CPT | Mod: CPTII,S$GLB,, | Performed by: INTERNAL MEDICINE

## 2020-05-05 PROCEDURE — 3079F PR MOST RECENT DIASTOLIC BLOOD PRESSURE 80-89 MM HG: ICD-10-PCS | Mod: CPTII,S$GLB,, | Performed by: INTERNAL MEDICINE

## 2020-05-05 PROCEDURE — 3079F DIAST BP 80-89 MM HG: CPT | Mod: CPTII,S$GLB,, | Performed by: INTERNAL MEDICINE

## 2020-05-05 PROCEDURE — 3074F PR MOST RECENT SYSTOLIC BLOOD PRESSURE < 130 MM HG: ICD-10-PCS | Mod: CPTII,S$GLB,, | Performed by: INTERNAL MEDICINE

## 2020-05-05 PROCEDURE — 99999 PR PBB SHADOW E&M-EST. PATIENT-LVL IV: ICD-10-PCS | Mod: PBBFAC,,, | Performed by: INTERNAL MEDICINE

## 2020-05-05 PROCEDURE — 99214 OFFICE O/P EST MOD 30 MIN: CPT | Mod: S$GLB,,, | Performed by: INTERNAL MEDICINE

## 2020-05-05 PROCEDURE — 99999 PR PBB SHADOW E&M-EST. PATIENT-LVL IV: CPT | Mod: PBBFAC,,, | Performed by: INTERNAL MEDICINE

## 2020-05-05 RX ORDER — LEFLUNOMIDE 20 MG/1
20 TABLET ORAL DAILY
Qty: 90 TABLET | Refills: 0 | Status: SHIPPED | OUTPATIENT
Start: 2020-05-05 | End: 2021-03-22 | Stop reason: SDUPTHER

## 2020-05-05 NOTE — PROGRESS NOTES
I have personally reviewed the history, and discussed assessment and plan with fellow.    The patient location is: home  The chief complaint leading to consultation is: PsA, psoriasis  Visit type: audio only  Total time spent with patient: 10 min  Each patient to whom he or she provides medical services by telemedicine is:  (1) informed of the relationship between the physician and patient and the respective role of any other health care provider with respect to management of the patient; and (2) notified that he or she may decline to receive medical services by telemedicine and may withdraw from such care at any time.    Notes:     PsA TJ 0  SJ 0  Pt global 0  ESR 12 CRP 6.2  DAS28 1.74 [id number]  1.67(both remission)  DAPSA  TJ 0  SJ 0 Pt global 0 Pt pain 0.5  CRP 0.62 = 1.12(remission-  LDA) she feels no need to add adalimumab at present  Psoriasis; severe plantar psoriasis(has been applying clobetasol max 2 days ast a tme) and severe bilateral onycholysis toenails. Minimal thin plaque right elbow  Hypertension 126/83 on amlodipine 5mg daily and carvedilol 12.5mg twice daily  Hyperlipidemia   on atorvastatin 10mg daily       F/u Dr. Montoya for hypertension, hyperlipidemia   leflunomide 20mg daily  Celecoxib 200mg daily prn  Clobetasol twice daily prn  RTC 3 months with standing labs if still here. She is selling house and moving to [place] as soon as that occurs.   Answers for HPI/ROS submitted by the patient on 5/4/2020   fever: No  eye redness: No  headaches: No  shortness of breath: No  chest pain: No  trouble swallowing: No  diarrhea: No  constipation: No  unexpected weight change: No  genital sore: No  dysuria: No  During the last 3 days, have you had a skin rash?: No  Bruises or bleeds easily: No  cough: No

## 2020-05-05 NOTE — PROGRESS NOTES
"Subjective:       Patient ID: Mable Arguello is a 49 y.o. female.    Chief Complaint: Psoriatic Arthritis    HPI     The patient location is: Home  The chief complaint leading to consultation is: Psoriatic arthritis  Visit type: audio only  Total time spent with patient: 30 minutes  Each patient to whom he or she provides medical services by telemedicine is:  (1) informed of the relationship between the physician and patient and the respective role of any other health care provider with respect to management of the patient; and (2) notified that he or she may decline to receive medical services by telemedicine and may withdraw from such care at any time.    Notes:     The patient is a 49 year old woman with a history of epilepsy, psoriatic arthritis on leflunomide 20mg and celecoxib 200mg daily PRN, severe plantar psoriasis and betamethasone cream PRN, HTN, HLD on atorvastatin 10mg. She was last seen in Rheum clinic in November 2019. The patient reports that she currently does not have joint pain or swelling, back pain, or rashes. She states that  Her plantar psoriasis is mostly well-controlled and she gets rashes only occasionally when she cuts the grass. She does report occasional aching when the front comes through.    Treatment History:   Methotrexate: Discontinued due to liver function abnormalities while the patient was Tegretol    Review of Systems   Constitutional: Negative for fever and unexpected weight change.   HENT: Negative for trouble swallowing.    Eyes: Negative for redness.   Respiratory: Negative for cough and shortness of breath.    Cardiovascular: Negative for chest pain.   Gastrointestinal: Negative for constipation and diarrhea.   Genitourinary: Negative for dysuria and genital sores.   Skin: Negative for rash.   Neurological: Negative for headaches.   Hematological: Does not bruise/bleed easily.         Objective:   /83   Pulse 69   Ht 5' 9.6" (1.768 m)   Wt 95.2 kg (209 lb 12.8 " oz)   LMP 07/08/2008   BMI 30.45 kg/m²      Physical Exam      11/6/2019   Tender (SALAZAR-28) 0 / 28    Swollen (SALAZAR-28) 0 / 28    Provider Global Assessment 0 (mm)   Patient Global Assessment 0 (mm)   ESR 9 (mm/hr)   CRP 3.9 (mg/L)   SALAZAR-28 (ESR) 1.54 (Remission)   SALAZAR-28 (CRP) 1.53 (Remission)     CBC (4/17/2020): WNL  CMP: WNL  CRP: 6.2  ESR 12  Vit D (10/9/2019): 39  Pre-DMARDs done 5/7/2019  Arthritis survey (5/8/2019): Minimal degenerative changes, no erosions  DEXA (6/19/2013): Normal     Assessment:       1. Psoriasis    2. Psoriatic arthritis    3. Vitamin D deficiency        The patient is a 49 year old woman with a history of psoriatic arthritis on leflunomide 20mg and celecoxib 200mg daily PRN, severe plantar psoriasis and clobetasol twice daily PRN, HTN, and HLD on atorvastatin 10mg.     Plan:       Problem List Items Addressed This Visit        Derm    Psoriasis - Primary       Endocrine    Vitamin D deficiency       Orthopedic    Psoriatic arthritis (Chronic)        - Continue leflunomide 20mg daily and celebrex PRN  - Continue betamethasone cream per Dermatology  - CBC, CMP, ESR, CRP, and vitamin D in 3 months  - DEXA scan when able  - Immunization status: up to date and documented.  - Advised COVID precautions including handwashing and mask-wearing      Patient seen and discussed with Dr. Noel    RTC in 6 months    Rekha Santoyo M.D.  Rheumatology, PGY 4

## 2020-05-22 ENCOUNTER — PATIENT MESSAGE (OUTPATIENT)
Dept: RHEUMATOLOGY | Facility: CLINIC | Age: 50
End: 2020-05-22

## 2020-08-11 ENCOUNTER — OFFICE VISIT (OUTPATIENT)
Dept: NEUROLOGY | Facility: CLINIC | Age: 50
End: 2020-08-11
Payer: COMMERCIAL

## 2020-08-11 VITALS
HEIGHT: 70 IN | SYSTOLIC BLOOD PRESSURE: 160 MMHG | DIASTOLIC BLOOD PRESSURE: 94 MMHG | WEIGHT: 158.31 LBS | HEART RATE: 70 BPM | BODY MASS INDEX: 22.66 KG/M2

## 2020-08-11 DIAGNOSIS — G40.319 GENERALIZED CONVULSIVE EPILEPSY WITH INTRACTABLE EPILEPSY: Primary | ICD-10-CM

## 2020-08-11 DIAGNOSIS — G43.109 MIGRAINE WITH AURA AND WITHOUT STATUS MIGRAINOSUS, NOT INTRACTABLE: ICD-10-CM

## 2020-08-11 PROCEDURE — 99999 PR PBB SHADOW E&M-EST. PATIENT-LVL III: ICD-10-PCS | Mod: PBBFAC,,, | Performed by: PSYCHIATRY & NEUROLOGY

## 2020-08-11 PROCEDURE — 3008F PR BODY MASS INDEX (BMI) DOCUMENTED: ICD-10-PCS | Mod: CPTII,S$GLB,, | Performed by: PSYCHIATRY & NEUROLOGY

## 2020-08-11 PROCEDURE — 99214 PR OFFICE/OUTPT VISIT, EST, LEVL IV, 30-39 MIN: ICD-10-PCS | Mod: S$GLB,,, | Performed by: PSYCHIATRY & NEUROLOGY

## 2020-08-11 PROCEDURE — 3080F PR MOST RECENT DIASTOLIC BLOOD PRESSURE >= 90 MM HG: ICD-10-PCS | Mod: CPTII,S$GLB,, | Performed by: PSYCHIATRY & NEUROLOGY

## 2020-08-11 PROCEDURE — 99999 PR PBB SHADOW E&M-EST. PATIENT-LVL III: CPT | Mod: PBBFAC,,, | Performed by: PSYCHIATRY & NEUROLOGY

## 2020-08-11 PROCEDURE — 3077F PR MOST RECENT SYSTOLIC BLOOD PRESSURE >= 140 MM HG: ICD-10-PCS | Mod: CPTII,S$GLB,, | Performed by: PSYCHIATRY & NEUROLOGY

## 2020-08-11 PROCEDURE — 3080F DIAST BP >= 90 MM HG: CPT | Mod: CPTII,S$GLB,, | Performed by: PSYCHIATRY & NEUROLOGY

## 2020-08-11 PROCEDURE — 99214 OFFICE O/P EST MOD 30 MIN: CPT | Mod: S$GLB,,, | Performed by: PSYCHIATRY & NEUROLOGY

## 2020-08-11 PROCEDURE — 3077F SYST BP >= 140 MM HG: CPT | Mod: CPTII,S$GLB,, | Performed by: PSYCHIATRY & NEUROLOGY

## 2020-08-11 PROCEDURE — 3008F BODY MASS INDEX DOCD: CPT | Mod: CPTII,S$GLB,, | Performed by: PSYCHIATRY & NEUROLOGY

## 2020-08-11 RX ORDER — LAMOTRIGINE 200 MG/1
400 TABLET ORAL DAILY
Qty: 180 TABLET | Refills: 1 | Status: SHIPPED | OUTPATIENT
Start: 2020-08-11

## 2020-08-11 RX ORDER — UBIDECARENONE 30 MG
30 CAPSULE ORAL 3 TIMES DAILY
COMMUNITY
Start: 2020-08-11 | End: 2022-05-16

## 2020-08-11 RX ORDER — LANOLIN ALCOHOL/MO/W.PET/CERES
50 CREAM (GRAM) TOPICAL DAILY
Qty: 90 TABLET | Refills: 3 | Status: SHIPPED | OUTPATIENT
Start: 2020-08-11

## 2020-08-11 RX ORDER — TOPIRAMATE 50 MG/1
50 TABLET, FILM COATED ORAL DAILY
Qty: 90 TABLET | Refills: 3 | Status: SHIPPED | OUTPATIENT
Start: 2020-08-11 | End: 2021-01-22

## 2020-08-11 NOTE — PROGRESS NOTES
Name: Elier Arguello  MRN: [id number]   CSN: [id number]      Date: 08/11/2020    HISTORY OF PRESENT ILLNESS (HPI)    Interval Histories    2020/08/11 2019/03/28  Patient continues to do well and remains seizure free.  She occasionally has migraine aura but without headache and these last only minutes.  She does not have a strategy for adherence but despite this nosz have occurred.  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 She had B12 deficiency and takes supplemental B12 daily.  Last documented level was low.    2017/11/01  The patient remains on 400 mg of Lamictal daily.  Levels have been stable for the past 3 yrs.  She continues to take B-complex as she had been deficient in B12.  Last sz was in 2004.  Topiramate was changed to Lamictal due to paresthesia in her limbs.      Results for ELIER ARGUELLO (MRN [id number]) as of 11/1/2017 13:14   Ref. Range 3/11/2015 14:05 12/19/2015 09:15 10/4/2016 07:31 3/20/2017 07:43   Lamotrigine Lvl Latest Ref Range: 2.0 - 15.0 ug/mL 7.8 3.8 5.1 4.5   Results for ELIER ARGUELLO (MRN [id number]) as of 11/1/2017 13:14   Ref. Range 6/12/2013 15:30 9/25/2014 11:22   Vitamin B-12 Latest Ref Range: 210 - 950 pg/mL 280 376     2016/03/9   Pt arrives to clinic alone today.  She says she has remained seizure free for 11 years.  Previously she had remained seizure free for 14 years though she had an event during her last pregnancy at that time.  Says that her migraines finally have stopped.  Says that she had her last one in March.  Says they used to happen monthly, even after a hysterectomy.  However, now she says that she has not had an event in months.  She expresses a hope that maybe she has gone through menopause, which could bear responsibility for this positive change in her symptoms.    Says her last psoriatic arthritis flare occurred between January and march of this year, with her symptoms of chronic fatigue worsening concomitantly.  Her symptoms are much improved  now.     She remains on lamictal 400mg PO daily.  Denies side effects presently.  She takes it daily with her cup of coffee at her desk every morning.  She takes it with her coffee at breakfast over the weekends.  When she forgets to take her meds, she simply takes a double dose.     Her symptoms of numbness and tingling of the fingers have resolved since stopping topamax. Denies any requests for changes, or new needs in clinic today.      2014/09/24   Last visit the Topiramate was stopped and Lamotrigine was started.  The tingling in fingers/toes stopped.  Her hair stated to fall out and the Derm she saw thought it was a combination of Vera and Lamictal.  He started her on Rogane which she uses daily.     She had a colonoscopy and was found by the GI to be fructose intolerant.  She avoids fruits that contain fructose and she has started to feel better.  She is no longer easily fatigued which started in 2008.  She had GI symptoms of irritable bowel, diarrhea, constipation and incontinence which has improved with the change in diet.  Leg cramps which occur mainly at night have dramatically improved.  She use to need to use an inhaler for SOB but with the diet changes has not needed to continue using it.     She continues to have once a month symptom complex of tingling of her scalp, visual change (Tunnel vision), feeling a weight is over her, with change in mood, depression, nausea and vomiting.  Headache is a minor part of the complex.  These will last only a couple of minutes.  She has experienced one occasion of post coital exploding headache.  She is taking B-vit supplements and Mg.      Epilepsy History   The patient is a 49 y.o. yo Right handed  female referred for consultation by Dr. Nathan Sam.  The patient was unaccompanied and she provided all of the history.  Seizures started at 11 years of age.  She had convulsions that have been controlled with Tegretol XR.  Last seizure was in 2004 and was 3  "weeks after delivery of her daughter and before that the previous seizure was at age 21 (1992).   Aura consisted of tingling down the left side of her body into her toes, tunnel vision, and nausea.  She had a history of severe headaches which occur after a convulsion.  She has a a strong family history of migraine in her maternal aunt and her daughter.     She reports that she use to have "dizzy spells" which had occurred around her menses.  She has had her ovaries removed in Mar 2-12.  The initial symptoms were dizziness with associated nausea and subsequent vomiting.  She becomes confused and blank out.  I reviewed the records from Dr Sam and she had EEGs done years ago that did show right temporal sharp waves.  Although she has been seizure free for years the presence of an epileptic focus dictates continued treatment.      Seizure Seminology  Seizure Type 1  Classification:   Aura - visual perceptual change  Ictus  - Nonconv -  - Conv - generalized jerking with tongue biting  - Duration -   Post-ictal  - Symptoms- fatigue.  - Duration- two days.  Seizure Frequency - last one GTC after her daughter was borne 2004.  Age of onset - 11y age    Seizure Triggers  Sleep Deprivation - None  Other medicaitons - None  Psych/stress - None  Photic stimulation - None  Hyperventilation - None  Medical Problems - None  Menses - No  Sensory Stimulation (light, sound, etc)     AED Treatments  Present regimen  lamotrigine (Lamictal, LTG)     Results for ELIER SAVAGE (MRN [id number]) as of 1/8/2014 13:58   Ref. Range 6/12/2013 15:30   Thiamine Latest Range:  ug/L 53   Vitamin B2 Latest Range: 1-19 mcg/L 4   Vitamin B6 Latest Range: 5-50 ug/L 7   Vit D, 25-Hydroxy Latest Range: 30-96 ng/mL 18 (L)       Prior treatments  carbamazepine (Tegretol, CBZ) -  mg 3 BID- she can take only brand Tegretol XR.      Not tried  acetazolamide (Diamox, AZM)  ethosuximide (Zarontin, ESM)  gabapentin (Neurontin, GPN)  lacosamide " "(Vimpat, LCS)   lamotrigine (Lamictal, LTG)   levetiracetam (Keppra, LEV)  methsuximide (Celontin, MSM)  methyphenytoin (Mesantion, MHT)  oxcarbazepine (Trileptal OXC)  phenobarbital (Pb)  phenytoin (Dilantin, PHT)  pregabalin (Lyrica, PGB)   primidone (Mysoline, PRM)  retigabine (Potiga, RTG)  rufinamide (Banzel, RUF)  tiagabine (Gabatril,  TGB)  topiramate (Topamax, TPM)  viagabatrin, (Sabril, VGB)  vagal nerve stimulator (VNS)  valproic acid (Depakote, VPA)  zonisamide (Zonegran, ZNA)  Benzodiazepines  diazepam - rectal (Diastatl)  diazepam - oral (Valium, DZ)  clonazepam (Klonopin, CZP)  clorazepate (Tranxene, CLZ)  clobezam (Frizium, CLB)  Ativan  Brain Stimulation  Vagal Nerve Stimulato  DBS    Compliance method  Memory - yes  Pill Box - no  Luigi calendar - no  Turn over pill bottle - no  Phone alarm - no    Seizure Evaluation  EEG - R temporal focus - No Report  Amb EEG -   EEG\Video Monitoring -   MRI - normal - No Report  CT Scan -   PET Scan - none  Neuropsychological evaluation -   DEXA Scan - 2007 - "good"    Potential Epilepsy Risk Factors:   Pregnancy/Labor/Delivery - full term uncomplicated pregnancy labor and vaginal delivery  Febrile seizures - none  Head injury  - none  CNS infection - none     Stroke - none  Family Hx of Sz - none      PAST MEDICAL HISTORY:   Active Ambulatory Problems     Diagnosis Date Noted    Generalized convulsive epilepsy with intractable epilepsy 12/21/2012    At risk for osteoporosis/osteopenia 12/21/2012    Migraine with aura and without status migrainosus, not intractable 06/12/2013    Psoriatic arthritis 09/02/2014    Psoriasis 09/02/2014    Fructose intolerance 09/25/2014    Encounter for monitoring leflunomide therapy 12/10/2014    Vitamin D deficiency 09/15/2015    Screening for cardiovascular condition 09/02/2016    Hypertension 09/02/2016    Hyperlipidemia 09/21/2016    Onycholysis of toenail 03/29/2017    B12 deficiency 11/01/2017    Polyp of nasal " cavity 03/01/2019    Immunosuppression 05/20/2019    Vaccine counseling 05/20/2019    Precordial pain      Resolved Ambulatory Problems     Diagnosis Date Noted    Jellyfish sting 08/04/2017    Acute maxillary sinusitis 03/01/2019    Chronic infection of sinus 03/01/2019     Past Medical History:   Diagnosis Date    Abnormal Pap smear     Arthritis     Endometriosis     History of chicken pox     History of shingles     IBS (irritable bowel syndrome)     Malabsorption     Migraine     Normal vaginal delivery 8/07/2004    Pelvic pain in female     PONV (postoperative nausea and vomiting)     Seizure disorder     Seizures         PAST SURGICAL HISTORY including Epilepsy surgery:   Past Surgical History:   Procedure Laterality Date    CHOLECYSTECTOMY      Laparoscopic    COLONOSCOPY  08/25/2014    Dr. Beckie bridges lap      FUNCTIONAL ENDOSCOPIC SINUS SURGERY (FESS) USING COMPUTER-ASSISTED NAVIGATION N/A 3/1/2019    Procedure: FESS, USING COMPUTER-ASSISTED NAVIGATION, Balloon Sinuplasty;  Surgeon: Abdi Harris MD;  Location: [facility];  Service: ENT;  Laterality: N/A;    HYSTERECTOMY      LEEP      NASAL SEPTOPLASTY N/A 3/1/2019    Procedure: SEPTOPLASTY, NOSE;  Surgeon: Abdi Harris MD;  Location: [facility];  Service: ENT;  Laterality: N/A;    PELVIC LAPAROSCOPY      SALPINGOOPHORECTOMY      Left        FAMILY HISTORY:   Family History   Problem Relation Age of Onset    Psoriasis Mother     Psoriasis Sister     Diabetes Mellitus Maternal Grandmother     Heart disease Maternal Grandmother     Diabetes Maternal Grandmother     Hypertension Maternal Grandmother     Muscular dystrophy Maternal Grandfather     Hyperlipidemia Maternal Grandfather     Heart disease Paternal Grandfather     Hyperlipidemia Father     Breast cancer Neg Hx          SOCIAL HISTORY:   Social History     Socioeconomic History    Marital status:      Spouse name: Not on file    Number of  children: Not on file    Years of education: Not on file    Highest education level: Not on file   Occupational History    Not on file   Social Needs    Financial resource strain: Not on file    Food insecurity     Worry: Not on file     Inability: Not on file    Transportation needs     Medical: Not on file     Non-medical: Not on file   Tobacco Use    Smoking status: Former Smoker     Quit date: 2003     Years since quittin.2    Smokeless tobacco: Never Used   Substance and Sexual Activity    Alcohol use: Yes     Comment: 2-3 glasses of wine per day    Drug use: No    Sexual activity: Yes     Partners: Male   Lifestyle    Physical activity     Days per week: Not on file     Minutes per session: Not on file    Stress: Not on file   Relationships    Social connections     Talks on phone: Not on file     Gets together: Not on file     Attends Ep[employer] service: Not on file     Active member of club or organization: Not on file     Attends meetings of clubs or organizations: Not on file     Relationship status: Not on file   Other Topics Concern    Not on file   Social History Narrative    Not on file      a) Marital status:                                                     b) Living situation: patient lives with her   c) Employed/Unemployed/Other: Employed full time    DRIVING HISTORY:  Currently driving: Yes      LEVEL OF EDUCATION: college graduate    SUBSTANCE USE:  Social History     Tobacco Use    Smoking status: Former Smoker     Quit date: 2003     Years since quittin.2    Smokeless tobacco: Never Used   Substance and Sexual Activity    Alcohol use: Yes     Comment: 2-3 glasses of wine per day    Drug use: No    Sexual activity: Yes     Partners: Male      Social History     Tobacco Use    Smoking status: Former Smoker     Quit date: 2003     Years since quittin.2    Smokeless tobacco: Never Used   Substance Use Topics    Alcohol use: Yes      "Comment: 2-3 glasses of wine per day        ALLERGIES: Topamax [topiramate]     BP (!) 160/94   Pulse 70   Ht 5' 9.6" (1.768 m)   Wt 71.8 kg (158 lb 4.6 oz)   LMP 07/08/2008   BMI 22.97 kg/m²     Higher Cortical Function:    Patient is a well developed, pleasant, well groomed individual appearing their stated age  Oriented - intact to person, place and time and followed two step instruction correctly.    Spell WORLD - Patients response: forward - WORLD; backwards -   Subtraction of serial 7s from 100 - 3 steps performed correct  Memory - Patient recalled 3 of 3 objects after 5 min.    Fund of knowledge was appropriate.    R-L Orientation - Intact   Language - Speech was fluent without evidence for an aphasia.  No agnosias, agraphesthesia, or astereognosis was present.  Extinction - not found with double simultaneous stimulation present in a proximal-distal or right-left distrubution.  Cranial Nerves II - XII:    EOMs were intact with normal smooth and no nystagmus.    PERRLA. D/C   Funduscopic exam - disc were flat with normal A/V ratio and no exudates or hemorrhages.  Motor - facial movement was symmetrical and normal.    Facial sensory - Light touch and pin prick sensations were normal.    Hearing was normal to finger rub.  Palate and tongue movement were normal    Normal power and bulk was found in the massiter and rotator muscles of the neck.  Motor: Power, bulk and tone were normal in all extremities.  Sensory: Light touch, pin prick, vibration and position senses were normal in all extremities.    Coordination:       Rapid alternating movements and rapid finger tapping - normal.       Finger to nose - nl.       Arm roll - symmetrical.    Gait:  Station, gait and tandem walking were done without difficulty and Romberg was negative.  Deep tendon reflexes:  Biceps - 2+ sym; Triceps - 2+ sym; brachioradialis - 2+ sym; Knee - 2+ sym; Ankle - 2+ sym;  Tremor: resting, postural, intentional - none  Frontal " Release signs: Glabellar - neg; Snout - neg; Root - neg; palmomental - neg; grasp - neg    IMPRESSION  Secondarily generalized convulsions -   2. Migraine without aura. - Improved  3. Systemic inflammatory disease   4. Chronic Fatigue Syndrome - improved  5. Fructose intolerance      DISPOSITION:    1. Continue followup with Dr Noel   2. Continue Supplemtation with B Vits and mg++  3.  Continue Lamictal 400 mg per day  4. Vit B12 level today -  ltg level was done earlier this year  5. RTC 12 months

## 2020-08-14 DIAGNOSIS — L40.9 PSORIASIS: Primary | ICD-10-CM

## 2020-08-14 DIAGNOSIS — L60.1 ONYCHOLYSIS OF TOENAIL: ICD-10-CM

## 2020-09-17 ENCOUNTER — OFFICE VISIT (OUTPATIENT)
Dept: DERMATOLOGY | Facility: CLINIC | Age: 50
End: 2020-09-17
Payer: COMMERCIAL

## 2020-09-17 VITALS — WEIGHT: 214 LBS | BODY MASS INDEX: 31.7 KG/M2 | HEIGHT: 69 IN

## 2020-09-17 DIAGNOSIS — L60.3 DYSTROPHIC NAIL: ICD-10-CM

## 2020-09-17 DIAGNOSIS — L40.0 PSORIASIS VULGARIS: Primary | ICD-10-CM

## 2020-09-17 DIAGNOSIS — L60.1 ONYCHOLYSIS OF TOENAIL: ICD-10-CM

## 2020-09-17 PROCEDURE — 99203 OFFICE O/P NEW LOW 30 MIN: CPT | Mod: S$GLB,,, | Performed by: DERMATOLOGY

## 2020-09-17 PROCEDURE — 88312 SPECIAL STAINS GROUP 1: CPT | Performed by: PATHOLOGY

## 2020-09-17 PROCEDURE — 88312 SPECIAL STAINS GROUP 1: CPT | Mod: 26,,, | Performed by: PATHOLOGY

## 2020-09-17 PROCEDURE — 88305 TISSUE EXAM BY PATHOLOGIST: CPT | Performed by: PATHOLOGY

## 2020-09-17 PROCEDURE — 99203 PR OFFICE/OUTPT VISIT, NEW, LEVL III, 30-44 MIN: ICD-10-PCS | Mod: S$GLB,,, | Performed by: DERMATOLOGY

## 2020-09-17 PROCEDURE — 99999 PR PBB SHADOW E&M-EST. PATIENT-LVL III: CPT | Mod: PBBFAC,,, | Performed by: DERMATOLOGY

## 2020-09-17 PROCEDURE — 3008F BODY MASS INDEX DOCD: CPT | Mod: CPTII,S$GLB,, | Performed by: DERMATOLOGY

## 2020-09-17 PROCEDURE — 88312 PR  SPECIAL STAINS,GROUP I: ICD-10-PCS | Mod: 26,,, | Performed by: PATHOLOGY

## 2020-09-17 PROCEDURE — 88305 TISSUE EXAM BY PATHOLOGIST: ICD-10-PCS | Mod: 26,,, | Performed by: PATHOLOGY

## 2020-09-17 PROCEDURE — 3008F PR BODY MASS INDEX (BMI) DOCUMENTED: ICD-10-PCS | Mod: CPTII,S$GLB,, | Performed by: DERMATOLOGY

## 2020-09-17 PROCEDURE — 99999 PR PBB SHADOW E&M-EST. PATIENT-LVL III: ICD-10-PCS | Mod: PBBFAC,,, | Performed by: DERMATOLOGY

## 2020-09-17 PROCEDURE — 88305 TISSUE EXAM BY PATHOLOGIST: CPT | Mod: 26,,, | Performed by: PATHOLOGY

## 2020-09-17 PROCEDURE — 87101 SKIN FUNGI CULTURE: CPT

## 2020-09-17 NOTE — LETTER
September 20, 2020      Waqar Noel MD  [address]  [facility] [address]           [facility]  [address] [place] [address]  Phone: [phone]  Fax: [phone]          Patient: Mable Arguello   MR Number: [id number]   YOB: 1970   Date of Visit: 9/17/2020       Dear Dr. Waqar Noel:    Thank you for referring Mable Arguello to me for evaluation. Attached you will find relevant portions of my assessment and plan of care.    If you have questions, please do not hesitate to call me. I look forward to following Mable Arguello along with you.    Sincerely,    Kaelyn Arguello MD    Enclosure  CC:  No Recipients    If you would like to receive this communication electronically, please contact [email or web address] or [phone] to request more information on [facility] access.    For providers and/or their staff who would like to refer a patient to [facility], please contact us through our one-stop-shop provider referral line, [facility], at [phone].    If you feel you have received this communication in error or would no longer like to receive these types of communications, please e-mail [email or web address]

## 2020-09-17 NOTE — PROGRESS NOTES
Subjective:       Patient ID:  Mable Arguello is a 49 y.o. female who presents for   Chief Complaint   Patient presents with    Nail Problem     HPI  48 yo F presents for evaluation of rash on feet and abnormal appearing toenails. She was diagnosed with PsA in 2014 and Ps in 2017.  Her joints and fingernails improved but toenails didn't.      PsA and Ps: leflunomide 20mg and celecoxib 200mg daily PRN, severe plantar psoriasis and betamethasone cream PRN   Rheum Tx history: Methotrexate-->Discontinued due to liver function abnormalities     Sister with h/o psoriasis    Past Medical History:   Diagnosis Date    Abnormal Pap smear     LEEP    Arthritis     Endometriosis     History of chicken pox     History of shingles     Hyperlipidemia     Hypertension     IBS (irritable bowel syndrome)     Malabsorption     Migraine     Normal vaginal delivery 8/07/2004    x 1    Pelvic pain in female     PONV (postoperative nausea and vomiting)     Psoriasis     Psoriatic arthritis     Seizure disorder     Seizures     [place] last 2004    Vitamin D deficiency      Review of Systems   Musculoskeletal: Negative for joint swelling and arthralgias.   Skin: Positive for rash. Negative for itching and dry skin.        Objective:    Physical Exam   Constitutional: She appears well-developed and well-nourished.   HENT:   Mouth/Throat: Lips normal.    Eyes: Lids are normal.    Neurological: She is alert and oriented to person, place, and time.   Psychiatric: She has a normal mood and affect.   Skin:   Areas Examined (abnormalities noted in diagram):   Scalp / Hair Palpated and Inspected  Head / Face Inspection Performed  Neck Inspection Performed  RUE Inspected  LUE Inspection Performed  RLE Inspected  LLE Inspection Performed  Nails and Digits Inspection Performed             Diagram Legend     Erythematous scaling macule/papule c/w actinic keratosis       Vascular papule c/w angioma      Pigmented  verrucoid papule/plaque c/w seborrheic keratosis      Yellow umbilicated papule c/w sebaceous hyperplasia      Irregularly shaped tan macule c/w lentigo     1-2 mm smooth white papules consistent with Milia      Movable subcutaneous cyst with punctum c/w epidermal inclusion cyst      Subcutaneous movable cyst c/w pilar cyst      Firm pink to brown papule c/w dermatofibroma      Pedunculated fleshy papule(s) c/w skin tag(s)      Evenly pigmented macule c/w junctional nevus     Mildly variegated pigmented, slightly irregular-bordered macule c/w mildly atypical nevus      Flesh colored to evenly pigmented papule c/w intradermal nevus       Pink pearly papule/plaque c/w basal cell carcinoma      Erythematous hyperkeratotic cursted plaque c/w SCC      Surgical scar with no sign of skin cancer recurrence      Open and closed comedones      Inflammatory papules and pustules      Verrucoid papule consistent consistent with wart     Erythematous eczematous patches and plaques     Dystrophic onycholytic nail with subungual debris c/w onychomycosis     Umbilicated papule    Erythematous-base heme-crusted tan verrucoid plaque consistent with inflamed seborrheic keratosis     Erythematous Silvery Scaling Plaque c/w Psoriasis     See annotation      Assessment / Plan:      Pathology Orders:     Normal Orders This Visit    Specimen to Pathology, Dermatology     Comments:    Number of Specimens:->1  ------------------------->-------------------------  Spec 1 Procedure:->Other (Specify)  nail for PAS  Spec 1 Clinical Impression:->r/o onychomycosis  Spec 1 Source:->R great toenail    Questions:    Procedure Type: Dermatology and skin neoplasms    Number of Specimens: 1    ------------------------: -------------------------    Spec 1 Procedure: Other (Specify) Comment - nail for PAS    Spec 1 Clinical Impression: r/o onychomycosis    Spec 1 Source: R great toenail        Dystrophic nail  -     Fungal culture , skin, hair, or  nails-specimen sent for Cx as well  -     Specimen to Pathology, Dermatology  Nail clipping sent for PAS    Psoriasis vulgaris  The patient does have a h/o Ps but at lease a portion of the rash present today is tinea (KOH positive today). Recommend treating tinea first and then we can see what is remaining         Follow up for pending culture.

## 2020-09-20 ENCOUNTER — PATIENT MESSAGE (OUTPATIENT)
Dept: DERMATOLOGY | Facility: CLINIC | Age: 50
End: 2020-09-20

## 2020-09-23 LAB
FINAL PATHOLOGIC DIAGNOSIS: NORMAL
GROSS: NORMAL

## 2020-09-29 ENCOUNTER — TELEPHONE (OUTPATIENT)
Dept: DERMATOLOGY | Facility: CLINIC | Age: 50
End: 2020-09-29

## 2020-09-29 RX ORDER — CICLOPIROX OLAMINE 7.7 MG/100ML
SUSPENSION TOPICAL 2 TIMES DAILY
Qty: 60 ML | Refills: 1 | Status: SHIPPED | OUTPATIENT
Start: 2020-09-29

## 2020-09-29 NOTE — TELEPHONE ENCOUNTER
Pt contacted & advised . Pt asked if Dr Arguello could send topical antifungal for nails to her Pharmacy .       ----- Message from Teresa Tapia sent at 9/29/2020  8:52 AM CDT -----  Contact: self  Type:  Patient Returning Call    Who Called:  patient  Who Left Message for Patient:  no idea  Does the patient know what this is regarding?:  thinks for labs  Best Call Back Number:  [phone]  Additional Information:  Thanks

## 2020-10-19 LAB — FUNGUS BLD CULT: NORMAL

## 2020-10-25 ENCOUNTER — PATIENT MESSAGE (OUTPATIENT)
Dept: DERMATOLOGY | Facility: CLINIC | Age: 50
End: 2020-10-25

## 2021-02-23 ENCOUNTER — PATIENT MESSAGE (OUTPATIENT)
Dept: RHEUMATOLOGY | Facility: CLINIC | Age: 51
End: 2021-02-23

## 2021-03-12 ENCOUNTER — PATIENT MESSAGE (OUTPATIENT)
Dept: RHEUMATOLOGY | Facility: CLINIC | Age: 51
End: 2021-03-12

## 2021-03-20 ENCOUNTER — LAB VISIT (OUTPATIENT)
Dept: LAB | Facility: HOSPITAL | Age: 51
End: 2021-03-20
Attending: INTERNAL MEDICINE
Payer: COMMERCIAL

## 2021-03-20 DIAGNOSIS — Z79.899 ENCOUNTER FOR MONITORING LEFLUNOMIDE THERAPY: ICD-10-CM

## 2021-03-20 DIAGNOSIS — Z51.81 ENCOUNTER FOR MONITORING LEFLUNOMIDE THERAPY: ICD-10-CM

## 2021-03-20 LAB
ALBUMIN SERPL BCP-MCNC: 3.9 G/DL (ref 3.5–5.2)
ALP SERPL-CCNC: 120 U/L (ref 55–135)
ALT SERPL W/O P-5'-P-CCNC: 30 U/L (ref 10–44)
ANION GAP SERPL CALC-SCNC: 10 MMOL/L (ref 8–16)
AST SERPL-CCNC: 23 U/L (ref 10–40)
BASOPHILS # BLD AUTO: 0.06 K/UL (ref 0–0.2)
BASOPHILS NFR BLD: 1.1 % (ref 0–1.9)
BILIRUB SERPL-MCNC: 0.3 MG/DL (ref 0.1–1)
BUN SERPL-MCNC: 8 MG/DL (ref 6–20)
CALCIUM SERPL-MCNC: 9.6 MG/DL (ref 8.7–10.5)
CHLORIDE SERPL-SCNC: 103 MMOL/L (ref 95–110)
CO2 SERPL-SCNC: 25 MMOL/L (ref 23–29)
CREAT SERPL-MCNC: 0.8 MG/DL (ref 0.5–1.4)
CRP SERPL-MCNC: 6.3 MG/L (ref 0–8.2)
DIFFERENTIAL METHOD: NORMAL
EOSINOPHIL # BLD AUTO: 0.2 K/UL (ref 0–0.5)
EOSINOPHIL NFR BLD: 3.9 % (ref 0–8)
ERYTHROCYTE [DISTWIDTH] IN BLOOD BY AUTOMATED COUNT: 13.2 % (ref 11.5–14.5)
ERYTHROCYTE [SEDIMENTATION RATE] IN BLOOD BY WESTERGREN METHOD: 11 MM/HR (ref 0–20)
EST. GFR  (AFRICAN AMERICAN): >60 ML/MIN/1.73 M^2
EST. GFR  (NON AFRICAN AMERICAN): >60 ML/MIN/1.73 M^2
GLUCOSE SERPL-MCNC: 102 MG/DL (ref 70–110)
HCT VFR BLD AUTO: 42.8 % (ref 37–48.5)
HGB BLD-MCNC: 14.1 G/DL (ref 12–16)
IMM GRANULOCYTES # BLD AUTO: 0.01 K/UL (ref 0–0.04)
IMM GRANULOCYTES NFR BLD AUTO: 0.2 % (ref 0–0.5)
LYMPHOCYTES # BLD AUTO: 1.7 K/UL (ref 1–4.8)
LYMPHOCYTES NFR BLD: 30.8 % (ref 18–48)
MCH RBC QN AUTO: 28.7 PG (ref 27–31)
MCHC RBC AUTO-ENTMCNC: 32.9 G/DL (ref 32–36)
MCV RBC AUTO: 87 FL (ref 82–98)
MONOCYTES # BLD AUTO: 0.5 K/UL (ref 0.3–1)
MONOCYTES NFR BLD: 9 % (ref 4–15)
NEUTROPHILS # BLD AUTO: 3 K/UL (ref 1.8–7.7)
NEUTROPHILS NFR BLD: 55 % (ref 38–73)
NRBC BLD-RTO: 0 /100 WBC
PLATELET # BLD AUTO: 310 K/UL (ref 150–350)
PMV BLD AUTO: 10.6 FL (ref 9.2–12.9)
POTASSIUM SERPL-SCNC: 3.6 MMOL/L (ref 3.5–5.1)
PROT SERPL-MCNC: 7.3 G/DL (ref 6–8.4)
RBC # BLD AUTO: 4.92 M/UL (ref 4–5.4)
SODIUM SERPL-SCNC: 138 MMOL/L (ref 136–145)
WBC # BLD AUTO: 5.36 K/UL (ref 3.9–12.7)

## 2021-03-20 PROCEDURE — 85025 COMPLETE CBC W/AUTO DIFF WBC: CPT | Performed by: INTERNAL MEDICINE

## 2021-03-20 PROCEDURE — 36415 COLL VENOUS BLD VENIPUNCTURE: CPT | Mod: PO | Performed by: INTERNAL MEDICINE

## 2021-03-20 PROCEDURE — 85651 RBC SED RATE NONAUTOMATED: CPT | Mod: PO | Performed by: INTERNAL MEDICINE

## 2021-03-20 PROCEDURE — 86140 C-REACTIVE PROTEIN: CPT | Performed by: INTERNAL MEDICINE

## 2021-03-20 PROCEDURE — 80053 COMPREHEN METABOLIC PANEL: CPT | Performed by: INTERNAL MEDICINE

## 2021-03-22 ENCOUNTER — PATIENT MESSAGE (OUTPATIENT)
Dept: RHEUMATOLOGY | Facility: CLINIC | Age: 51
End: 2021-03-22

## 2021-03-22 DIAGNOSIS — L40.50 PSORIATIC ARTHRITIS: ICD-10-CM

## 2021-03-22 RX ORDER — LEFLUNOMIDE 20 MG/1
20 TABLET ORAL DAILY
Qty: 90 TABLET | Refills: 0 | Status: SHIPPED | OUTPATIENT
Start: 2021-03-22 | End: 2021-07-14

## 2021-05-05 ENCOUNTER — TELEPHONE (OUTPATIENT)
Dept: NEUROLOGY | Facility: CLINIC | Age: 51
End: 2021-05-05

## 2021-06-19 ENCOUNTER — PATIENT MESSAGE (OUTPATIENT)
Dept: NEUROLOGY | Facility: CLINIC | Age: 51
End: 2021-06-19

## 2021-07-14 ENCOUNTER — TELEPHONE (OUTPATIENT)
Dept: RHEUMATOLOGY | Facility: CLINIC | Age: 51
End: 2021-07-14

## 2021-07-14 ENCOUNTER — PATIENT MESSAGE (OUTPATIENT)
Dept: NEUROLOGY | Facility: CLINIC | Age: 51
End: 2021-07-14

## 2021-07-14 ENCOUNTER — PATIENT MESSAGE (OUTPATIENT)
Dept: RHEUMATOLOGY | Facility: CLINIC | Age: 51
End: 2021-07-14

## 2021-07-14 DIAGNOSIS — L40.50 PSA (PSORIATIC ARTHRITIS): Primary | ICD-10-CM

## 2021-07-20 ENCOUNTER — LAB VISIT (OUTPATIENT)
Dept: LAB | Facility: HOSPITAL | Age: 51
End: 2021-07-20
Attending: INTERNAL MEDICINE
Payer: COMMERCIAL

## 2021-07-20 ENCOUNTER — PATIENT MESSAGE (OUTPATIENT)
Dept: RHEUMATOLOGY | Facility: CLINIC | Age: 51
End: 2021-07-20

## 2021-07-20 DIAGNOSIS — L40.50 PSA (PSORIATIC ARTHRITIS): ICD-10-CM

## 2021-07-20 LAB
ALBUMIN SERPL BCP-MCNC: 3.7 G/DL (ref 3.5–5.2)
ALP SERPL-CCNC: 99 U/L (ref 55–135)
ALT SERPL W/O P-5'-P-CCNC: 17 U/L (ref 10–44)
ANION GAP SERPL CALC-SCNC: 9 MMOL/L (ref 8–16)
AST SERPL-CCNC: 13 U/L (ref 10–40)
BASOPHILS # BLD AUTO: 0.02 K/UL (ref 0–0.2)
BASOPHILS NFR BLD: 0.2 % (ref 0–1.9)
BILIRUB SERPL-MCNC: 0.3 MG/DL (ref 0.1–1)
BUN SERPL-MCNC: 14 MG/DL (ref 6–20)
CALCIUM SERPL-MCNC: 9.6 MG/DL (ref 8.7–10.5)
CHLORIDE SERPL-SCNC: 112 MMOL/L (ref 95–110)
CO2 SERPL-SCNC: 20 MMOL/L (ref 23–29)
CREAT SERPL-MCNC: 0.8 MG/DL (ref 0.5–1.4)
CRP SERPL-MCNC: 3 MG/L (ref 0–8.2)
DIFFERENTIAL METHOD: ABNORMAL
EOSINOPHIL # BLD AUTO: 0.1 K/UL (ref 0–0.5)
EOSINOPHIL NFR BLD: 0.5 % (ref 0–8)
ERYTHROCYTE [DISTWIDTH] IN BLOOD BY AUTOMATED COUNT: 13.4 % (ref 11.5–14.5)
ERYTHROCYTE [SEDIMENTATION RATE] IN BLOOD BY WESTERGREN METHOD: 5 MM/HR (ref 0–20)
EST. GFR  (AFRICAN AMERICAN): >60 ML/MIN/1.73 M^2
EST. GFR  (NON AFRICAN AMERICAN): >60 ML/MIN/1.73 M^2
GLUCOSE SERPL-MCNC: 118 MG/DL (ref 70–110)
HCT VFR BLD AUTO: 41 % (ref 37–48.5)
HGB BLD-MCNC: 13.6 G/DL (ref 12–16)
IMM GRANULOCYTES # BLD AUTO: 0.06 K/UL (ref 0–0.04)
IMM GRANULOCYTES NFR BLD AUTO: 0.6 % (ref 0–0.5)
LYMPHOCYTES # BLD AUTO: 1.1 K/UL (ref 1–4.8)
LYMPHOCYTES NFR BLD: 10.7 % (ref 18–48)
MCH RBC QN AUTO: 28.7 PG (ref 27–31)
MCHC RBC AUTO-ENTMCNC: 33.2 G/DL (ref 32–36)
MCV RBC AUTO: 87 FL (ref 82–98)
MONOCYTES # BLD AUTO: 0.6 K/UL (ref 0.3–1)
MONOCYTES NFR BLD: 5.9 % (ref 4–15)
NEUTROPHILS # BLD AUTO: 8.1 K/UL (ref 1.8–7.7)
NEUTROPHILS NFR BLD: 82.1 % (ref 38–73)
NRBC BLD-RTO: 0 /100 WBC
PLATELET # BLD AUTO: 339 K/UL (ref 150–450)
PMV BLD AUTO: 10.9 FL (ref 9.2–12.9)
POTASSIUM SERPL-SCNC: 3.6 MMOL/L (ref 3.5–5.1)
PROT SERPL-MCNC: 6.9 G/DL (ref 6–8.4)
RBC # BLD AUTO: 4.74 M/UL (ref 4–5.4)
SODIUM SERPL-SCNC: 141 MMOL/L (ref 136–145)
WBC # BLD AUTO: 9.88 K/UL (ref 3.9–12.7)

## 2021-07-20 PROCEDURE — 80053 COMPREHEN METABOLIC PANEL: CPT | Performed by: INTERNAL MEDICINE

## 2021-07-20 PROCEDURE — 86140 C-REACTIVE PROTEIN: CPT | Performed by: INTERNAL MEDICINE

## 2021-07-20 PROCEDURE — 36415 COLL VENOUS BLD VENIPUNCTURE: CPT | Mod: PO | Performed by: INTERNAL MEDICINE

## 2021-07-20 PROCEDURE — 85025 COMPLETE CBC W/AUTO DIFF WBC: CPT | Performed by: INTERNAL MEDICINE

## 2021-07-20 PROCEDURE — 85651 RBC SED RATE NONAUTOMATED: CPT | Mod: PO | Performed by: INTERNAL MEDICINE

## 2021-11-08 ENCOUNTER — TELEPHONE (OUTPATIENT)
Dept: RHEUMATOLOGY | Facility: CLINIC | Age: 51
End: 2021-11-08
Payer: COMMERCIAL

## 2021-11-09 ENCOUNTER — PATIENT MESSAGE (OUTPATIENT)
Dept: RHEUMATOLOGY | Facility: CLINIC | Age: 51
End: 2021-11-09
Payer: COMMERCIAL

## 2021-11-10 ENCOUNTER — TELEPHONE (OUTPATIENT)
Dept: RHEUMATOLOGY | Facility: CLINIC | Age: 51
End: 2021-11-10
Payer: COMMERCIAL

## 2021-12-12 ENCOUNTER — TELEPHONE (OUTPATIENT)
Dept: RHEUMATOLOGY | Facility: CLINIC | Age: 51
End: 2021-12-12
Payer: COMMERCIAL

## 2021-12-12 DIAGNOSIS — L40.50 PSORIATIC ARTHRITIS: ICD-10-CM

## 2021-12-12 RX ORDER — LEFLUNOMIDE 20 MG/1
TABLET ORAL
Qty: 30 TABLET | Refills: 0 | Status: SHIPPED | OUTPATIENT
Start: 2021-12-12 | End: 2022-01-16

## 2021-12-17 ENCOUNTER — LAB VISIT (OUTPATIENT)
Dept: LAB | Facility: HOSPITAL | Age: 51
End: 2021-12-17
Attending: INTERNAL MEDICINE
Payer: COMMERCIAL

## 2021-12-17 DIAGNOSIS — L40.50 PSA (PSORIATIC ARTHRITIS): ICD-10-CM

## 2021-12-17 LAB
ALBUMIN SERPL BCP-MCNC: 3.9 G/DL (ref 3.5–5.2)
ALP SERPL-CCNC: 95 U/L (ref 55–135)
ALT SERPL W/O P-5'-P-CCNC: 26 U/L (ref 10–44)
ANION GAP SERPL CALC-SCNC: 6 MMOL/L (ref 8–16)
AST SERPL-CCNC: 21 U/L (ref 10–40)
BASOPHILS # BLD AUTO: 0.04 K/UL (ref 0–0.2)
BASOPHILS NFR BLD: 0.6 % (ref 0–1.9)
BILIRUB SERPL-MCNC: 0.4 MG/DL (ref 0.1–1)
BUN SERPL-MCNC: 12 MG/DL (ref 6–20)
CALCIUM SERPL-MCNC: 9.6 MG/DL (ref 8.7–10.5)
CHLORIDE SERPL-SCNC: 104 MMOL/L (ref 95–110)
CO2 SERPL-SCNC: 29 MMOL/L (ref 23–29)
CREAT SERPL-MCNC: 0.7 MG/DL (ref 0.5–1.4)
CRP SERPL-MCNC: 3.1 MG/L (ref 0–8.2)
DIFFERENTIAL METHOD: ABNORMAL
EOSINOPHIL # BLD AUTO: 0.2 K/UL (ref 0–0.5)
EOSINOPHIL NFR BLD: 3.8 % (ref 0–8)
ERYTHROCYTE [DISTWIDTH] IN BLOOD BY AUTOMATED COUNT: 13.1 % (ref 11.5–14.5)
ERYTHROCYTE [SEDIMENTATION RATE] IN BLOOD BY WESTERGREN METHOD: 7 MM/HR (ref 0–20)
EST. GFR  (AFRICAN AMERICAN): >60 ML/MIN/1.73 M^2
EST. GFR  (NON AFRICAN AMERICAN): >60 ML/MIN/1.73 M^2
GLUCOSE SERPL-MCNC: 98 MG/DL (ref 70–110)
HCT VFR BLD AUTO: 46.5 % (ref 37–48.5)
HGB BLD-MCNC: 14.8 G/DL (ref 12–16)
IMM GRANULOCYTES # BLD AUTO: 0.01 K/UL (ref 0–0.04)
IMM GRANULOCYTES NFR BLD AUTO: 0.2 % (ref 0–0.5)
LYMPHOCYTES # BLD AUTO: 1.6 K/UL (ref 1–4.8)
LYMPHOCYTES NFR BLD: 25.1 % (ref 18–48)
MCH RBC QN AUTO: 28.9 PG (ref 27–31)
MCHC RBC AUTO-ENTMCNC: 31.8 G/DL (ref 32–36)
MCV RBC AUTO: 91 FL (ref 82–98)
MONOCYTES # BLD AUTO: 0.4 K/UL (ref 0.3–1)
MONOCYTES NFR BLD: 6.6 % (ref 4–15)
NEUTROPHILS # BLD AUTO: 4 K/UL (ref 1.8–7.7)
NEUTROPHILS NFR BLD: 63.7 % (ref 38–73)
NRBC BLD-RTO: 0 /100 WBC
PLATELET # BLD AUTO: 320 K/UL (ref 150–450)
PMV BLD AUTO: 10.5 FL (ref 9.2–12.9)
POTASSIUM SERPL-SCNC: 4.2 MMOL/L (ref 3.5–5.1)
PROT SERPL-MCNC: 6.9 G/DL (ref 6–8.4)
RBC # BLD AUTO: 5.12 M/UL (ref 4–5.4)
SODIUM SERPL-SCNC: 139 MMOL/L (ref 136–145)
WBC # BLD AUTO: 6.25 K/UL (ref 3.9–12.7)

## 2021-12-17 PROCEDURE — 80053 COMPREHEN METABOLIC PANEL: CPT | Performed by: INTERNAL MEDICINE

## 2021-12-17 PROCEDURE — 85025 COMPLETE CBC W/AUTO DIFF WBC: CPT | Performed by: INTERNAL MEDICINE

## 2021-12-17 PROCEDURE — 86140 C-REACTIVE PROTEIN: CPT | Performed by: INTERNAL MEDICINE

## 2021-12-17 PROCEDURE — 85651 RBC SED RATE NONAUTOMATED: CPT | Mod: PO | Performed by: INTERNAL MEDICINE

## 2021-12-17 PROCEDURE — 36415 COLL VENOUS BLD VENIPUNCTURE: CPT | Mod: PO | Performed by: INTERNAL MEDICINE

## 2022-02-04 ENCOUNTER — TELEPHONE (OUTPATIENT)
Dept: RHEUMATOLOGY | Facility: CLINIC | Age: 52
End: 2022-02-04
Payer: COMMERCIAL

## 2022-02-08 ENCOUNTER — OFFICE VISIT (OUTPATIENT)
Dept: RHEUMATOLOGY | Facility: CLINIC | Age: 52
End: 2022-02-08
Payer: COMMERCIAL

## 2022-02-08 VITALS
HEIGHT: 70 IN | DIASTOLIC BLOOD PRESSURE: 93 MMHG | SYSTOLIC BLOOD PRESSURE: 153 MMHG | WEIGHT: 207 LBS | BODY MASS INDEX: 29.63 KG/M2 | HEART RATE: 74 BPM

## 2022-02-08 DIAGNOSIS — G89.29 CHRONIC LOW BACK PAIN WITHOUT SCIATICA, UNSPECIFIED BACK PAIN LATERALITY: Primary | ICD-10-CM

## 2022-02-08 DIAGNOSIS — M54.50 CHRONIC LOW BACK PAIN WITHOUT SCIATICA, UNSPECIFIED BACK PAIN LATERALITY: Primary | ICD-10-CM

## 2022-02-08 DIAGNOSIS — E78.5 HYPERLIPIDEMIA, UNSPECIFIED HYPERLIPIDEMIA TYPE: ICD-10-CM

## 2022-02-08 DIAGNOSIS — Z78.0 MENOPAUSE: ICD-10-CM

## 2022-02-08 DIAGNOSIS — L40.50 PSA (PSORIATIC ARTHRITIS): ICD-10-CM

## 2022-02-08 PROCEDURE — 99213 PR OFFICE/OUTPT VISIT, EST, LEVL III, 20-29 MIN: ICD-10-PCS | Mod: S$GLB,,, | Performed by: INTERNAL MEDICINE

## 2022-02-08 PROCEDURE — 99213 OFFICE O/P EST LOW 20 MIN: CPT | Mod: S$GLB,,, | Performed by: INTERNAL MEDICINE

## 2022-02-08 PROCEDURE — 99999 PR PBB SHADOW E&M-EST. PATIENT-LVL IV: ICD-10-PCS | Mod: PBBFAC,,, | Performed by: INTERNAL MEDICINE

## 2022-02-08 PROCEDURE — 99999 PR PBB SHADOW E&M-EST. PATIENT-LVL IV: CPT | Mod: PBBFAC,,, | Performed by: INTERNAL MEDICINE

## 2022-02-08 NOTE — PROGRESS NOTES
"Subjective:       Patient ID: Mable Arguello is a 51 y.o. female.    Chief Complaint: PsA; plantar psoriasis; nail psoriasis  HPI No psoriasis except feet. Sees Dr. Mitchell Taylor dermatologist. Clobetasol to plantar feet.  Joints "great" some LBP sometimes when wakes up, sometimes bending over.  Doesn't awaken from sleep. Not daily. Onset moving heavy laundry baskets, improved. Still on leflunomide 20mg daily. Off celecoxib since Oct b/o dyspepsia. Now prn rarely   Review of Systems   Constitutional: Negative for appetite change, fatigue, fever and unexpected weight change.   HENT: Negative for mouth sores and trouble swallowing.    Eyes: Negative for redness and visual disturbance.   Respiratory: Negative for cough, shortness of breath and wheezing.    Cardiovascular: Negative for chest pain and palpitations.   Gastrointestinal: Negative for abdominal pain, anal bleeding, blood in stool, constipation, diarrhea, nausea and vomiting.   Genitourinary: Negative for dysuria, frequency, genital sores and urgency.   Musculoskeletal: Negative for arthralgias, back pain, gait problem, joint swelling, myalgias, neck pain and neck stiffness.   Skin: Negative for rash.   Neurological: Negative for weakness, numbness and headaches.   Hematological: Negative for adenopathy. Does not bruise/bleed easily.   Psychiatric/Behavioral: Negative for sleep disturbance. The patient is not nervous/anxious.          Objective:   BP (!) 153/93   Pulse 74   Ht 5' 9.6" (1.768 m)   Wt 93.9 kg (207 lb)   LMP 07/08/2008   BMI 30.04 kg/m²      Physical Exam   Skin:   Bilateral plantar psoriasis  Onychomycosis all toenails   Psychiatric: Her behavior is normal. Mood, judgment and thought content normal.         11/6/2019 5/5/2020   Tender (SALAZAR-28) 0 / 28  0 / 28    Swollen (SALAZAR-28) 0 / 28  0 / 28    Provider Global 0 mm 0 mm   Patient Global 0 mm 0 mm   ESR 9 mm/hr 12 mm/hr   CRP 3.9 mg/L 6.2 mg/L   SALAZAR-28 (ESR) 1.54 (Remission) 1.74 " (Remission)   SALAZAR-28 (CRP) 1.53 (Remission) 1.67 (Remission)   CDAI Score 0  0       Results for ELIER SAVAGE (MRN [id number]) as of 2/8/2022 08:21   Ref. Range 12/17/2021 11:27   WBC Latest Ref Range: 3.90 - 12.70 K/uL 6.25   RBC Latest Ref Range: 4.00 - 5.40 M/uL 5.12   Hemoglobin Latest Ref Range: 12.0 - 16.0 g/dL 14.8   Hematocrit Latest Ref Range: 37.0 - 48.5 % 46.5   MCV Latest Ref Range: 82 - 98 fL 91   MCH Latest Ref Range: 27.0 - 31.0 pg 28.9   MCHC Latest Ref Range: 32.0 - 36.0 g/dL 31.8 (L)   RDW Latest Ref Range: 11.5 - 14.5 % 13.1   Platelets Latest Ref Range: 150 - 450 K/uL 320   MPV Latest Ref Range: 9.2 - 12.9 fL 10.5   Gran % Latest Ref Range: 38.0 - 73.0 % 63.7   Lymph % Latest Ref Range: 18.0 - 48.0 % 25.1   Mono % Latest Ref Range: 4.0 - 15.0 % 6.6   Eosinophil % Latest Ref Range: 0.0 - 8.0 % 3.8   Basophil % Latest Ref Range: 0.0 - 1.9 % 0.6   Immature Granulocytes Latest Ref Range: 0.0 - 0.5 % 0.2   Gran # (ANC) Latest Ref Range: 1.8 - 7.7 K/uL 4.0   Lymph # Latest Ref Range: 1.0 - 4.8 K/uL 1.6   Mono # Latest Ref Range: 0.3 - 1.0 K/uL 0.4   Eos # Latest Ref Range: 0.0 - 0.5 K/uL 0.2   Baso # Latest Ref Range: 0.00 - 0.20 K/uL 0.04   Immature Grans (Abs) Latest Ref Range: 0.00 - 0.04 K/uL 0.01   nRBC Latest Ref Range: 0 /100 WBC 0   Differential Method Unknown Automated   Sed Rate Latest Ref Range: 0 - 20 mm/Hr 7   Sodium Latest Ref Range: 136 - 145 mmol/L 139   Potassium Latest Ref Range: 3.5 - 5.1 mmol/L 4.2   Chloride Latest Ref Range: 95 - 110 mmol/L 104   CO2 Latest Ref Range: 23 - 29 mmol/L 29   Anion Gap Latest Ref Range: 8 - 16 mmol/L 6 (L)   BUN Latest Ref Range: 6 - 20 mg/dL 12   Creatinine Latest Ref Range: 0.5 - 1.4 mg/dL 0.7   eGFR if non African American Latest Ref Range: >60 mL/min/1.73 m^2 >60.0   eGFR if African American Latest Ref Range: >60 mL/min/1.73 m^2 >60.0   Glucose Latest Ref Range: 70 - 110 mg/dL 98   Calcium Latest Ref Range: 8.7 - 10.5 mg/dL 9.6   Alkaline  Phosphatase Latest Ref Range: 55 - 135 U/L 95   PROTEIN TOTAL Latest Ref Range: 6.0 - 8.4 g/dL 6.9   Albumin Latest Ref Range: 3.5 - 5.2 g/dL 3.9   BILIRUBIN TOTAL Latest Ref Range: 0.1 - 1.0 mg/dL 0.4   AST Latest Ref Range: 10 - 40 U/L 21   ALT Latest Ref Range: 10 - 44 U/L 26   CRP Latest Ref Range: 0.0 - 8.2 mg/L 3.1     Results for ELIER SAVAGE (MRN [id number]) as of 2/8/2022 08:32   Ref. Range 12/2/2019 08:04   Cholesterol Latest Ref Range: 120 - 199 mg/dL 240 (H)   HDL Latest Ref Range: 40 - 75 mg/dL 96 (H)   HDL/Cholesterol Ratio Latest Ref Range: 20.0 - 50.0 % 40.0   LDL Cholesterol External Latest Ref Range: 63.0 - 159.0 mg/dL 130.0   Non-HDL Cholesterol Latest Units: mg/dL 144   Total Cholesterol/HDL Ratio Latest Ref Range: 2.0 - 5.0  2.5   Triglycerides Latest Ref Range: 30 - 150 mg/dL 70     The 10-year ASCVD risk score (Ki HUITRON Jr., et al., 2013) is: 2.2%    Values used to calculate the score:      Age: 51 years      Sex: Female      Is Non- : No      Diabetic: No      Tobacco smoker: No      Systolic Blood Pressure: 153 mmHg      Is BP treated: Yes      HDL Cholesterol: 96 mg/dL      Total Cholesterol: 240 mg/dL  Assessment:          PsA TJ 0  SJ 0  Pt global 10  ESR 7 CRP 3.1  DAS28 1.5 [id number]  1.61(both remission) CDAI 1  DAPSA  TJ 0  SJ 0 Pt global 1 Pt pain 1  CRP 0.31 = 2.31(remission-  ) she feels no need to add adalimumab at present  Psoriasis; severe plantar psoriasis(has been applying clobetasol max 2 days at a tme) and severe bilateral onycholysis toenails. Minimal thin plaque right elbow  Hypertension 1153/93 on amlodipine 5mg daily and carvedilol 12.5mg twice daily  Hyperlipidemia  12/2/19 was on atorvastatin 10mg daily but CCS  0 CTA 2/11/20 negative  Nonspecific LBP, does not sound inflammatory    Plan:    Cont leflunomide 20mg daily  No need to add adalimumab currently  Home BP monitor with goal < 120/80, if > contact Dr. Ortega, consider increase  amlodipine  X-rays lumbar spine and pelvis  DXA  RTC 3 months with standing labs and lipid panel

## 2022-02-08 NOTE — PATIENT INSTRUCTIONS
Home BP monitor  Goal < 120/80 contact Dr. Ortega to consider increase dose of amlodipine  Bone density due

## 2022-02-11 ENCOUNTER — HOSPITAL ENCOUNTER (OUTPATIENT)
Dept: RADIOLOGY | Facility: HOSPITAL | Age: 52
Discharge: HOME | End: 2022-02-11
Attending: INTERNAL MEDICINE
Payer: COMMERCIAL

## 2022-02-11 DIAGNOSIS — G89.29 CHRONIC LOW BACK PAIN WITHOUT SCIATICA, UNSPECIFIED BACK PAIN LATERALITY: ICD-10-CM

## 2022-02-11 DIAGNOSIS — L40.50 PSA (PSORIATIC ARTHRITIS): ICD-10-CM

## 2022-02-11 DIAGNOSIS — M54.50 CHRONIC LOW BACK PAIN WITHOUT SCIATICA, UNSPECIFIED BACK PAIN LATERALITY: ICD-10-CM

## 2022-02-11 PROCEDURE — 72170 XR PELVIS ROUTINE AP: ICD-10-PCS | Mod: 26,,, | Performed by: RADIOLOGY

## 2022-02-11 PROCEDURE — 72100 X-RAY EXAM L-S SPINE 2/3 VWS: CPT | Mod: 26,,, | Performed by: RADIOLOGY

## 2022-02-11 PROCEDURE — 72100 XR LUMBAR SPINE AP AND LATERAL: ICD-10-PCS | Mod: 26,,, | Performed by: RADIOLOGY

## 2022-02-11 PROCEDURE — 72170 X-RAY EXAM OF PELVIS: CPT | Mod: TC,FY,PO

## 2022-02-11 PROCEDURE — 72100 X-RAY EXAM L-S SPINE 2/3 VWS: CPT | Mod: TC,FY,PO

## 2022-02-11 PROCEDURE — 72170 X-RAY EXAM OF PELVIS: CPT | Mod: 26,,, | Performed by: RADIOLOGY

## 2022-04-18 ENCOUNTER — LAB VISIT (OUTPATIENT)
Dept: LAB | Facility: HOSPITAL | Age: 52
End: 2022-04-18
Payer: COMMERCIAL

## 2022-04-18 DIAGNOSIS — R56.9 GENERALIZED-ONSET SEIZURES: ICD-10-CM

## 2022-04-18 DIAGNOSIS — Z79.899 ENCOUNTER FOR LONG-TERM (CURRENT) USE OF OTHER MEDICATIONS: Primary | ICD-10-CM

## 2022-04-18 DIAGNOSIS — G45.9 INTERMITTENT CEREBRAL ISCHEMIA: ICD-10-CM

## 2022-04-18 LAB — AT III ACT/NOR PPP CHRO: 85 % (ref 83–118)

## 2022-04-18 PROCEDURE — 85305 CLOT INHIBIT PROT S TOTAL: CPT

## 2022-04-18 PROCEDURE — 81241 F5 GENE: CPT

## 2022-04-18 PROCEDURE — 36415 COLL VENOUS BLD VENIPUNCTURE: CPT | Mod: PO

## 2022-04-18 PROCEDURE — 85300 ANTITHROMBIN III ACTIVITY: CPT

## 2022-04-18 PROCEDURE — 85303 CLOT INHIBIT PROT C ACTIVITY: CPT

## 2022-04-18 PROCEDURE — 86147 CARDIOLIPIN ANTIBODY EA IG: CPT | Mod: 59

## 2022-04-18 PROCEDURE — 85730 THROMBOPLASTIN TIME PARTIAL: CPT

## 2022-04-18 PROCEDURE — 80175 DRUG SCREEN QUAN LAMOTRIGINE: CPT

## 2022-04-19 LAB — PROT C ACT/NOR PPP CHRO: 148 % (ref 70–150)

## 2022-04-20 LAB — PROT S ACT/NOR PPP: 153 % (ref 65–160)

## 2022-04-21 LAB
CARDIOLIPIN IGG SER IA-ACNC: <9.4 GPL (ref 0–14.99)
CARDIOLIPIN IGM SER IA-ACNC: <9.4 MPL (ref 0–12.49)
LAMOTRIGINE SERPL-MCNC: 3.7 UG/ML (ref 2–15)

## 2022-04-22 LAB
APTT IMM NP PPP: ABNORMAL SEC (ref 32–48)
APTT P HEP NEUT PPP: ABNORMAL SEC (ref 32–48)
CONFIRM APTT STACLOT: ABNORMAL
DRVVT SCREEN TO CONFIRM RATIO: ABNORMAL RATIO
F5 P.R506Q BLD/T QL: NEGATIVE
LA 3 SCREEN W REFLEX-IMP: ABNORMAL
LA NT DPL PPP QL: ABNORMAL
MIXING DRVVT: ABNORMAL SEC (ref 33–44)
PROTHROMBIN TIME: 11.7 SEC (ref 12–15.5)
REPTILASE TIME: ABNORMAL SEC
SCREEN APTT: 44 SEC (ref 32–48)
SCREEN DRVVT: 37 SEC (ref 33–44)
THROMBIN TIME: ABNORMAL SEC (ref 14.7–19.5)

## 2022-05-11 ENCOUNTER — TELEPHONE (OUTPATIENT)
Dept: RHEUMATOLOGY | Facility: CLINIC | Age: 52
End: 2022-05-11
Payer: COMMERCIAL

## 2022-05-11 ENCOUNTER — PATIENT MESSAGE (OUTPATIENT)
Dept: RHEUMATOLOGY | Facility: CLINIC | Age: 52
End: 2022-05-11
Payer: COMMERCIAL

## 2022-05-13 ENCOUNTER — LAB VISIT (OUTPATIENT)
Dept: LAB | Facility: HOSPITAL | Age: 52
End: 2022-05-13
Attending: INTERNAL MEDICINE
Payer: COMMERCIAL

## 2022-05-13 ENCOUNTER — OFFICE VISIT (OUTPATIENT)
Dept: RHEUMATOLOGY | Facility: CLINIC | Age: 52
End: 2022-05-13
Payer: COMMERCIAL

## 2022-05-13 VITALS
BODY MASS INDEX: 29.92 KG/M2 | HEIGHT: 70 IN | DIASTOLIC BLOOD PRESSURE: 89 MMHG | SYSTOLIC BLOOD PRESSURE: 137 MMHG | WEIGHT: 209 LBS | HEART RATE: 60 BPM

## 2022-05-13 DIAGNOSIS — G45.9 TIA (TRANSIENT ISCHEMIC ATTACK): Primary | ICD-10-CM

## 2022-05-13 DIAGNOSIS — L40.50 PSORIATIC ARTHRITIS: ICD-10-CM

## 2022-05-13 DIAGNOSIS — E78.5 HYPERLIPIDEMIA, UNSPECIFIED HYPERLIPIDEMIA TYPE: ICD-10-CM

## 2022-05-13 DIAGNOSIS — L40.50 PSA (PSORIATIC ARTHRITIS): ICD-10-CM

## 2022-05-13 LAB
ALBUMIN SERPL BCP-MCNC: 3.8 G/DL (ref 3.5–5.2)
ALP SERPL-CCNC: 103 U/L (ref 55–135)
ALT SERPL W/O P-5'-P-CCNC: 23 U/L (ref 10–44)
ANION GAP SERPL CALC-SCNC: 9 MMOL/L (ref 8–16)
AST SERPL-CCNC: 18 U/L (ref 10–40)
BASOPHILS # BLD AUTO: 0.06 K/UL (ref 0–0.2)
BASOPHILS NFR BLD: 1.2 % (ref 0–1.9)
BILIRUB SERPL-MCNC: 0.6 MG/DL (ref 0.1–1)
BUN SERPL-MCNC: 9 MG/DL (ref 6–20)
CALCIUM SERPL-MCNC: 9.7 MG/DL (ref 8.7–10.5)
CHLORIDE SERPL-SCNC: 105 MMOL/L (ref 95–110)
CHOLEST SERPL-MCNC: 237 MG/DL (ref 120–199)
CHOLEST/HDLC SERPL: 3.2 {RATIO} (ref 2–5)
CO2 SERPL-SCNC: 25 MMOL/L (ref 23–29)
CREAT SERPL-MCNC: 0.6 MG/DL (ref 0.5–1.4)
CRP SERPL-MCNC: 6.8 MG/L (ref 0–8.2)
DIFFERENTIAL METHOD: NORMAL
EOSINOPHIL # BLD AUTO: 0.3 K/UL (ref 0–0.5)
EOSINOPHIL NFR BLD: 5.4 % (ref 0–8)
ERYTHROCYTE [DISTWIDTH] IN BLOOD BY AUTOMATED COUNT: 13.2 % (ref 11.5–14.5)
ERYTHROCYTE [SEDIMENTATION RATE] IN BLOOD BY WESTERGREN METHOD: 19 MM/HR (ref 0–36)
EST. GFR  (AFRICAN AMERICAN): >60 ML/MIN/1.73 M^2
EST. GFR  (NON AFRICAN AMERICAN): >60 ML/MIN/1.73 M^2
GLUCOSE SERPL-MCNC: 103 MG/DL (ref 70–110)
HCT VFR BLD AUTO: 43.2 % (ref 37–48.5)
HDLC SERPL-MCNC: 73 MG/DL (ref 40–75)
HDLC SERPL: 30.8 % (ref 20–50)
HGB BLD-MCNC: 14.4 G/DL (ref 12–16)
IMM GRANULOCYTES # BLD AUTO: 0.02 K/UL (ref 0–0.04)
IMM GRANULOCYTES NFR BLD AUTO: 0.4 % (ref 0–0.5)
LDLC SERPL CALC-MCNC: 146.6 MG/DL (ref 63–159)
LYMPHOCYTES # BLD AUTO: 1.3 K/UL (ref 1–4.8)
LYMPHOCYTES NFR BLD: 25.6 % (ref 18–48)
MCH RBC QN AUTO: 29.6 PG (ref 27–31)
MCHC RBC AUTO-ENTMCNC: 33.3 G/DL (ref 32–36)
MCV RBC AUTO: 89 FL (ref 82–98)
MONOCYTES # BLD AUTO: 0.5 K/UL (ref 0.3–1)
MONOCYTES NFR BLD: 8.7 % (ref 4–15)
NEUTROPHILS # BLD AUTO: 3 K/UL (ref 1.8–7.7)
NEUTROPHILS NFR BLD: 58.7 % (ref 38–73)
NONHDLC SERPL-MCNC: 164 MG/DL
NRBC BLD-RTO: 0 /100 WBC
PLATELET # BLD AUTO: 303 K/UL (ref 150–450)
PMV BLD AUTO: 10.5 FL (ref 9.2–12.9)
POTASSIUM SERPL-SCNC: 4.1 MMOL/L (ref 3.5–5.1)
PROT SERPL-MCNC: 6.7 G/DL (ref 6–8.4)
RBC # BLD AUTO: 4.87 M/UL (ref 4–5.4)
SODIUM SERPL-SCNC: 139 MMOL/L (ref 136–145)
TRIGL SERPL-MCNC: 87 MG/DL (ref 30–150)
WBC # BLD AUTO: 5.15 K/UL (ref 3.9–12.7)

## 2022-05-13 PROCEDURE — 80053 COMPREHEN METABOLIC PANEL: CPT | Performed by: INTERNAL MEDICINE

## 2022-05-13 PROCEDURE — 86140 C-REACTIVE PROTEIN: CPT | Performed by: INTERNAL MEDICINE

## 2022-05-13 PROCEDURE — 99999 PR PBB SHADOW E&M-EST. PATIENT-LVL III: ICD-10-PCS | Mod: PBBFAC,,, | Performed by: INTERNAL MEDICINE

## 2022-05-13 PROCEDURE — 99213 OFFICE O/P EST LOW 20 MIN: CPT | Mod: S$GLB,,, | Performed by: INTERNAL MEDICINE

## 2022-05-13 PROCEDURE — 36415 COLL VENOUS BLD VENIPUNCTURE: CPT | Performed by: INTERNAL MEDICINE

## 2022-05-13 PROCEDURE — 85025 COMPLETE CBC W/AUTO DIFF WBC: CPT | Performed by: INTERNAL MEDICINE

## 2022-05-13 PROCEDURE — 85652 RBC SED RATE AUTOMATED: CPT | Performed by: INTERNAL MEDICINE

## 2022-05-13 PROCEDURE — 99213 PR OFFICE/OUTPT VISIT, EST, LEVL III, 20-29 MIN: ICD-10-PCS | Mod: S$GLB,,, | Performed by: INTERNAL MEDICINE

## 2022-05-13 PROCEDURE — 80061 LIPID PANEL: CPT | Performed by: INTERNAL MEDICINE

## 2022-05-13 PROCEDURE — 99999 PR PBB SHADOW E&M-EST. PATIENT-LVL III: CPT | Mod: PBBFAC,,, | Performed by: INTERNAL MEDICINE

## 2022-05-13 RX ORDER — ASPIRIN 81 MG/1
81 TABLET ORAL DAILY
COMMUNITY

## 2022-05-13 RX ORDER — LEFLUNOMIDE 20 MG/1
20 TABLET ORAL DAILY
Qty: 90 TABLET | Refills: 0 | Status: SHIPPED | OUTPATIENT
Start: 2022-05-13

## 2022-05-13 RX ORDER — TOPIRAMATE 50 MG/1
50 TABLET, FILM COATED ORAL ONCE
COMMUNITY

## 2022-05-13 NOTE — PROGRESS NOTES
I have personally taken the history and examined the patient and agree with the resident,s note as stated above   Agree with Dr. Thomas. Evidence of degenerative disc disease L3-4 and facet joint osteoarhritis L3-S1. Nothing to suggest psoriatic spondylitis. Justinmind              PACS Images for [facility] [place] View[facility]     Show images for X-Ray Lumbar Spine AP And Lateral    All Reviewers List    Waqar Noel MD on 2/11/2022 19:14     X-Ray Lumbar Spine AP And Lateral  Order: [id number]   Status: Final result       Visible to patient: Yes (seen)       Next appt: 05/16/2022 at 01:45 PM in Cardiology (Mary Araya, NP)       Dx: PSA (psoriatic arthritis); Chronic lo...       1 Result Note       1 Patient Communication      Details    Reading Physician Reading Date Result Priority   Ge Thomas IV, MD  [phone] 2/11/2022 Routine     Narrative & Impression  EXAMINATION:  XR LUMBAR SPINE AP AND LATERAL     CLINICAL HISTORY:  Low back pain, unspecified     TECHNIQUE:  AP, lateral and spot images were performed of the lumbar spine.     COMPARISON:  None     FINDINGS:  Surgical clips are noted in the right upper quadrant suggestive cholecystectomy clips.  Vertebral body alignment appears adequate.  Intervertebral disc height loss is noted at the L3-L4 level similar since the prior.  Vertebral body heights appear well preserved.  Facet arthropathy is noted at the L3-L4, L4-L5, and L5-S1 levels.     Impression:     See above        Electronically signed by: Ge Thomas MD  Date:                                            02/11/2022  Time:                                           12:28       The pelvis x-rays show normal sacroiliac joints and minimal if any hip arthritis. RJQ              PACS Images for [facility] [place] View[facility]     Show images for X-Ray Pelvis Routine AP    All Reviewers List    Waqar Noel MD on 2/11/2022 19:11     X-Ray Pelvis Routine AP  Order: [id number]   Status: Final result       Visible  to patient: Yes (seen)       Next appt: 05/16/2022 at 01:45 PM in Cardiology (Mary Araya, NP)       Dx: PSA (psoriatic arthritis); Chronic lo...       1 Result Note       1 Patient Communication      Details    Reading Physician Reading Date Result Priority   Ge Thomas IV, MD  [phone] 2/11/2022 Routine     Narrative & Impression  EXAMINATION:  XR PELVIS ROUTINE AP     CLINICAL HISTORY:  Low back pain, unspecified     TECHNIQUE:  AP view of the pelvis was performed.     COMPARISON:  None.     FINDINGS:  Mild superior acetabular spurring is noted bilaterally.  Mild left slightly greater than right hip joint space loss is noted.  No obvious fracture or malalignment is noted.     Impression:     See above        Electronically signed by: Ge Thomas MD  Date:                                            02/11/2022  Time:                                           12:19               Exam Ended: 02/11/22 11:57 Last Resulted: 02/11/22 12:19                        Exam Ended: 02/11/22 11:58                   PsA TJ 0  SJ 0  Pt global 40   ESR 19  CRP  DAS28 2.62(LDA)  [id number]  CDAI 4(LDA)  DAPSA  TJ 0  SJ 0 Pt global 4 Pt pain 4  CRP  = 8(LDA)   she feels no need to add adalimumab at present  Psoriasis; severe plantar psoriasis(has been applying clobetasol max 2 days at a tme) and severe bilateral onycholysis toenails. Minimal thin plaque right elbow  S/p TIA while driving, apparently not felt to be seizure. Seeing outside Neurologist. Appt with cardiologist Monday.   Hypertension 137/89  on amlodipine 5mg daily and carvedilol 12.5mg twice daily  Hyperlipidemia  12/2/19 was on atorvastatin 10mg daily but CCS  0 CTA 2/11/20 negative  Nonspecific LBP, does not sound inflammatory with packing for move to [place]    Add B2 GP1 antibodies to thrombophilia panel  F/u with Neurology and Cardiology for TIA  No NSAIDs, including celecoxib  Cont leflunomide 20mg daily  No need to add adalimumab currently  declines for plantar psoriasis  Home BP monitor with goal < 120/80, if > contact Dr. Ortega, consider increase amlodipine  DXA  RTC 3 months with standing labs   Answers for HPI/ROS submitted by the patient on 5/13/2022  fever: No  eye redness: No  mouth sores: No  headaches: No  shortness of breath: No  chest pain: No  trouble swallowing: No  diarrhea: No  constipation: No  unexpected weight change: No  genital sore: No  dysuria: No  During the last 3 days, have you had a skin rash?: No  Bruises or bleeds easily: No  cough: No

## 2022-05-13 NOTE — PROGRESS NOTES
Answers for HPI/ROS submitted by the patient on 5/13/2022  fever: No  eye redness: No  mouth sores: No  headaches: No  shortness of breath: No  chest pain: No  trouble swallowing: No  diarrhea: No  constipation: No  unexpected weight change: No  genital sore: No  dysuria: No  During the last 3 days, have you had a skin rash?: No  Bruises or bleeds easily: No  cough: No

## 2022-05-13 NOTE — PROGRESS NOTES
"  Subjective:       Patient ID: Mable Arguello is a 51 y.o. female.    Chief Complaint: PsA; plantar psoriasis; nail psoriasis  HPI X-rays lumbar spine and pelvis from previous visit note no signs of psoriatic spondylitis. She is currently planning to move to [place] in a couple of weeks. No psoriasis except feet.  She has some occasional back pain which she attributes to moving and getting boxes ready. Joints great otherwise. Still on leflunomide 20mg daily. She reports she plans to return here for future appointments. Patient reports she is being worked up for TIA by neurology.    Review of Systems   Constitutional: Negative for appetite change, fatigue, fever and unexpected weight change.   HENT: Negative for mouth sores and trouble swallowing.    Eyes: Negative for redness and visual disturbance.   Respiratory: Negative for cough, shortness of breath and wheezing.    Cardiovascular: Negative for chest pain and palpitations.   Gastrointestinal: Negative for abdominal pain, anal bleeding, blood in stool, constipation, diarrhea, nausea and vomiting.   Genitourinary: Negative for dysuria, frequency, genital sores and urgency.   Musculoskeletal: Negative for arthralgias, back pain, gait problem, joint swelling, myalgias, neck pain and neck stiffness.   Skin: Negative for rash.   Neurological: Negative for weakness, numbness and headaches.   Hematological: Negative for adenopathy. Does not bruise/bleed easily.   Psychiatric/Behavioral: Negative for sleep disturbance. The patient is not nervous/anxious.          Objective:   /89   Pulse 60   Ht 5' 9.6" (1.768 m)   Wt 94.8 kg (209 lb)   LMP 07/08/2008   BMI 30.33 kg/m²      Physical Exam   Skin:   Bilateral plantar psoriasis  Onychomycosis all toenails   Psychiatric: Her behavior is normal. Mood, judgment and thought content normal.      There is currently no information documented on the homunculus. Go to the Rheumatology activity and complete the " homunculus joint exam.      11/6/2019 5/5/2020 2/8/2022   Tender (SALAZAR-28) 0 / 28  0 / 28  0 / 28    Swollen (SALAZAR-28) 0 / 28  0 / 28  0 / 28    Provider Global 0 mm 0 mm 0 mm   Patient Global 0 mm 0 mm 10 mm   ESR 9 mm/hr 12 mm/hr 7 mm/hr   CRP 3.9 mg/L 6.2 mg/L 3.1 mg/L   SALAZAR-28 (ESR) 1.54 (Remission) 1.74 (Remission) 1.5 (Remission)   SALAZAR-28 (CRP) 1.53 (Remission) 1.67 (Remission) 1.61 (Remission)   CDAI Score 0  0  1       Results for ELIER SAVAGE (MRN [id number]) as of 2/8/2022 08:21   Ref. Range 12/17/2021 11:27   WBC Latest Ref Range: 3.90 - 12.70 K/uL 6.25   RBC Latest Ref Range: 4.00 - 5.40 M/uL 5.12   Hemoglobin Latest Ref Range: 12.0 - 16.0 g/dL 14.8   Hematocrit Latest Ref Range: 37.0 - 48.5 % 46.5   MCV Latest Ref Range: 82 - 98 fL 91   MCH Latest Ref Range: 27.0 - 31.0 pg 28.9   MCHC Latest Ref Range: 32.0 - 36.0 g/dL 31.8 (L)   RDW Latest Ref Range: 11.5 - 14.5 % 13.1   Platelets Latest Ref Range: 150 - 450 K/uL 320   MPV Latest Ref Range: 9.2 - 12.9 fL 10.5   Gran % Latest Ref Range: 38.0 - 73.0 % 63.7   Lymph % Latest Ref Range: 18.0 - 48.0 % 25.1   Mono % Latest Ref Range: 4.0 - 15.0 % 6.6   Eosinophil % Latest Ref Range: 0.0 - 8.0 % 3.8   Basophil % Latest Ref Range: 0.0 - 1.9 % 0.6   Immature Granulocytes Latest Ref Range: 0.0 - 0.5 % 0.2   Gran # (ANC) Latest Ref Range: 1.8 - 7.7 K/uL 4.0   Lymph # Latest Ref Range: 1.0 - 4.8 K/uL 1.6   Mono # Latest Ref Range: 0.3 - 1.0 K/uL 0.4   Eos # Latest Ref Range: 0.0 - 0.5 K/uL 0.2   Baso # Latest Ref Range: 0.00 - 0.20 K/uL 0.04   Immature Grans (Abs) Latest Ref Range: 0.00 - 0.04 K/uL 0.01   nRBC Latest Ref Range: 0 /100 WBC 0   Differential Method Unknown Automated   Sed Rate Latest Ref Range: 0 - 20 mm/Hr 7   Sodium Latest Ref Range: 136 - 145 mmol/L 139   Potassium Latest Ref Range: 3.5 - 5.1 mmol/L 4.2   Chloride Latest Ref Range: 95 - 110 mmol/L 104   CO2 Latest Ref Range: 23 - 29 mmol/L 29   Anion Gap Latest Ref Range: 8 - 16 mmol/L 6 (L)    BUN Latest Ref Range: 6 - 20 mg/dL 12   Creatinine Latest Ref Range: 0.5 - 1.4 mg/dL 0.7   eGFR if non African American Latest Ref Range: >60 mL/min/1.73 m^2 >60.0   eGFR if African American Latest Ref Range: >60 mL/min/1.73 m^2 >60.0   Glucose Latest Ref Range: 70 - 110 mg/dL 98   Calcium Latest Ref Range: 8.7 - 10.5 mg/dL 9.6   Alkaline Phosphatase Latest Ref Range: 55 - 135 U/L 95   PROTEIN TOTAL Latest Ref Range: 6.0 - 8.4 g/dL 6.9   Albumin Latest Ref Range: 3.5 - 5.2 g/dL 3.9   BILIRUBIN TOTAL Latest Ref Range: 0.1 - 1.0 mg/dL 0.4   AST Latest Ref Range: 10 - 40 U/L 21   ALT Latest Ref Range: 10 - 44 U/L 26   CRP Latest Ref Range: 0.0 - 8.2 mg/L 3.1     Results for ELIER SAVAGE (MRN [id number]) as of 2/8/2022 08:32   Ref. Range 12/2/2019 08:04   Cholesterol Latest Ref Range: 120 - 199 mg/dL 240 (H)   HDL Latest Ref Range: 40 - 75 mg/dL 96 (H)   HDL/Cholesterol Ratio Latest Ref Range: 20.0 - 50.0 % 40.0   LDL Cholesterol External Latest Ref Range: 63.0 - 159.0 mg/dL 130.0   Non-HDL Cholesterol Latest Units: mg/dL 144   Total Cholesterol/HDL Ratio Latest Ref Range: 2.0 - 5.0  2.5   Triglycerides Latest Ref Range: 30 - 150 mg/dL 70     The 10-year ASCVD risk score (Ki HUITRON Jr., et al., 2013) is: 1.7%    Values used to calculate the score:      Age: 51 years      Sex: Female      Is Non- : No      Diabetic: No      Tobacco smoker: No      Systolic Blood Pressure: 137 mmHg      Is BP treated: Yes      HDL Cholesterol: 96 mg/dL      Total Cholesterol: 240 mg/dL  Assessment:          PsA TJ 0  SJ 0  Pt global 10  ESR 7 CRP 3.1  DAS28 1.5 AHI06-OLD  1.61(both remission) CDAI 1  DAPSA  TJ 0  SJ 0 Pt global 1 Pt pain 1  CRP 0.31 = 2.31(remission-) she feels no need to add adalimumab at present  Psoriasis; severe plantar psoriasis(has been applying clobetasol max 2 days at a tme) and severe bilateral onycholysis toenails. Minimal thin plaque right elbow  Hypertension 1153/93 on amlodipine  5mg daily and carvedilol 12.5mg twice daily  Hyperlipidemia  12/2/19 was on atorvastatin 10mg daily but CCS  0 CTA 2/11/20 negative  Nonspecific LBP, does not sound inflammatory    Plan:    Cont leflunomide 20mg daily  No need to add adalimumab currently  Discontinue Celebrex due to TIA concern.  Home BP monitor with goal < 120/80, if > contact Dr. Ortega, consider increase amlodipine  DXA  RTC 3 months with standing labs and lipid panel    Van Schaefer MD  [employer] [facility] PGY1

## 2022-05-25 ENCOUNTER — LAB VISIT (OUTPATIENT)
Dept: LAB | Facility: HOSPITAL | Age: 52
End: 2022-05-25
Attending: STUDENT IN AN ORGANIZED HEALTH CARE EDUCATION/TRAINING PROGRAM
Payer: COMMERCIAL

## 2022-05-25 DIAGNOSIS — G45.9 TIA (TRANSIENT ISCHEMIC ATTACK): ICD-10-CM

## 2022-05-25 PROCEDURE — 36415 COLL VENOUS BLD VENIPUNCTURE: CPT | Mod: PO | Performed by: STUDENT IN AN ORGANIZED HEALTH CARE EDUCATION/TRAINING PROGRAM

## 2022-05-25 PROCEDURE — 86146 BETA-2 GLYCOPROTEIN ANTIBODY: CPT | Mod: 59 | Performed by: STUDENT IN AN ORGANIZED HEALTH CARE EDUCATION/TRAINING PROGRAM

## 2022-05-27 LAB
B2 GLYCOPROT1 IGA SER QL: <9 SAU
B2 GLYCOPROT1 IGG SER QL: <9 SGU
B2 GLYCOPROT1 IGM SER QL: 17 SMU

## 2022-08-12 ENCOUNTER — TELEPHONE (OUTPATIENT)
Dept: RHEUMATOLOGY | Facility: CLINIC | Age: 52
End: 2022-08-12
Payer: COMMERCIAL

## 2022-08-12 ENCOUNTER — PATIENT MESSAGE (OUTPATIENT)
Dept: RHEUMATOLOGY | Facility: CLINIC | Age: 52
End: 2022-08-12
Payer: COMMERCIAL

## 2024-10-31 ENCOUNTER — PATIENT MESSAGE (OUTPATIENT)
Dept: RHEUMATOLOGY | Facility: CLINIC | Age: 54
End: 2024-10-31
Payer: COMMERCIAL

## 2024-11-26 ENCOUNTER — PATIENT MESSAGE (OUTPATIENT)
Dept: RHEUMATOLOGY | Facility: CLINIC | Age: 54
End: 2024-11-26
Payer: COMMERCIAL